# Patient Record
Sex: FEMALE | Race: ASIAN | NOT HISPANIC OR LATINO | URBAN - METROPOLITAN AREA
[De-identification: names, ages, dates, MRNs, and addresses within clinical notes are randomized per-mention and may not be internally consistent; named-entity substitution may affect disease eponyms.]

---

## 2024-06-02 ENCOUNTER — HOSPITAL ENCOUNTER (INPATIENT)
Facility: MEDICAL CENTER | Age: 29
LOS: 5 days | DRG: 460 | End: 2024-06-07
Attending: EMERGENCY MEDICINE | Admitting: SURGERY
Payer: COMMERCIAL

## 2024-06-02 ENCOUNTER — APPOINTMENT (OUTPATIENT)
Dept: RADIOLOGY | Facility: MEDICAL CENTER | Age: 29
DRG: 460 | End: 2024-06-02
Attending: SURGERY
Payer: COMMERCIAL

## 2024-06-02 ENCOUNTER — APPOINTMENT (OUTPATIENT)
Dept: RADIOLOGY | Facility: MEDICAL CENTER | Age: 29
DRG: 460 | End: 2024-06-02
Attending: EMERGENCY MEDICINE
Payer: COMMERCIAL

## 2024-06-02 DIAGNOSIS — S32.009A CLOSED FRACTURE OF LUMBAR SPINE WITHOUT SPINAL CORD LESION, INITIAL ENCOUNTER (HCC): ICD-10-CM
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
## 2024-06-02 DIAGNOSIS — T14.90XA TRAUMA: ICD-10-CM

## 2024-06-02 DIAGNOSIS — S22.008A CLOSED FRACTURE OF SPINOUS PROCESS OF THORACIC VERTEBRA, INITIAL ENCOUNTER (HCC): ICD-10-CM

## 2024-06-02 DIAGNOSIS — S22.009A CLOSED FRACTURE OF TRANSVERSE PROCESS OF THORACIC VERTEBRA, INITIAL ENCOUNTER (HCC): ICD-10-CM

## 2024-06-02 PROBLEM — Z75.8 DISCHARGE PLANNING ISSUES: Status: ACTIVE | Noted: 2024-06-02

## 2024-06-02 PROBLEM — Z53.09 CONTRAINDICATION TO DEEP VEIN THROMBOSIS (DVT) PROPHYLAXIS: Status: ACTIVE | Noted: 2024-06-02

## 2024-06-02 PROBLEM — S32.000A LUMBAR COMPRESSION FRACTURE, CLOSED, INITIAL ENCOUNTER (HCC): Status: ACTIVE | Noted: 2024-06-02

## 2024-06-02 PROBLEM — S81.801A: Status: ACTIVE | Noted: 2024-06-02

## 2024-06-02 PROBLEM — T14.8XXA ABRASION: Status: ACTIVE | Noted: 2024-06-02

## 2024-06-02 LAB
ABO GROUP BLD: NORMAL
ALBUMIN SERPL BCP-MCNC: 4 G/DL (ref 3.2–4.9)
ALBUMIN/GLOB SERPL: 1.7 G/DL
ALP SERPL-CCNC: 44 U/L (ref 30–99)
ALT SERPL-CCNC: 14 U/L (ref 2–50)
ANION GAP SERPL CALC-SCNC: 18 MMOL/L (ref 7–16)
APTT PPP: 26.9 SEC (ref 24.7–36)
AST SERPL-CCNC: 38 U/L (ref 12–45)
BILIRUB SERPL-MCNC: 0.5 MG/DL (ref 0.1–1.5)
BLD GP AB SCN SERPL QL: NORMAL
BUN SERPL-MCNC: 20 MG/DL (ref 8–22)
CALCIUM ALBUM COR SERPL-MCNC: 8.8 MG/DL (ref 8.5–10.5)
CALCIUM SERPL-MCNC: 8.8 MG/DL (ref 8.5–10.5)
CHLORIDE SERPL-SCNC: 103 MMOL/L (ref 96–112)
CO2 SERPL-SCNC: 16 MMOL/L (ref 20–33)
CREAT SERPL-MCNC: 0.65 MG/DL (ref 0.5–1.4)
ERYTHROCYTE [DISTWIDTH] IN BLOOD BY AUTOMATED COUNT: 42.5 FL (ref 35.9–50)
ETHANOL BLD-MCNC: <10.1 MG/DL
GFR SERPLBLD CREATININE-BSD FMLA CKD-EPI: 123 ML/MIN/1.73 M 2
GLOBULIN SER CALC-MCNC: 2.4 G/DL (ref 1.9–3.5)
GLUCOSE SERPL-MCNC: 151 MG/DL (ref 65–99)
HCG SERPL QL: NEGATIVE
HCT VFR BLD AUTO: 39.3 % (ref 37–47)
HGB BLD-MCNC: 13.4 G/DL (ref 12–16)
INR PPP: 1.14 (ref 0.87–1.13)
MCH RBC QN AUTO: 33 PG (ref 27–33)
MCHC RBC AUTO-ENTMCNC: 34.1 G/DL (ref 32.2–35.5)
MCV RBC AUTO: 96.8 FL (ref 81.4–97.8)
PLATELET # BLD AUTO: 287 K/UL (ref 164–446)
PMV BLD AUTO: 9.2 FL (ref 9–12.9)
POTASSIUM SERPL-SCNC: 3.3 MMOL/L (ref 3.6–5.5)
PROT SERPL-MCNC: 6.4 G/DL (ref 6–8.2)
PROTHROMBIN TIME: 14.7 SEC (ref 12–14.6)
RBC # BLD AUTO: 4.06 M/UL (ref 4.2–5.4)
RH BLD: NORMAL
SODIUM SERPL-SCNC: 137 MMOL/L (ref 135–145)
WBC # BLD AUTO: 19.2 K/UL (ref 4.8–10.8)

## 2024-06-02 PROCEDURE — 700105 HCHG RX REV CODE 258: Performed by: SURGERY

## 2024-06-02 PROCEDURE — 96374 THER/PROPH/DIAG INJ IV PUSH: CPT

## 2024-06-02 PROCEDURE — 90471 IMMUNIZATION ADMIN: CPT

## 2024-06-02 PROCEDURE — 770022 HCHG ROOM/CARE - ICU (200)

## 2024-06-02 PROCEDURE — 72131 CT LUMBAR SPINE W/O DYE: CPT

## 2024-06-02 PROCEDURE — 36415 COLL VENOUS BLD VENIPUNCTURE: CPT

## 2024-06-02 PROCEDURE — 90715 TDAP VACCINE 7 YRS/> IM: CPT | Performed by: EMERGENCY MEDICINE

## 2024-06-02 PROCEDURE — 70450 CT HEAD/BRAIN W/O DYE: CPT

## 2024-06-02 PROCEDURE — 700111 HCHG RX REV CODE 636 W/ 250 OVERRIDE (IP): Performed by: NURSE PRACTITIONER

## 2024-06-02 PROCEDURE — 72148 MRI LUMBAR SPINE W/O DYE: CPT

## 2024-06-02 PROCEDURE — 3E0234Z INTRODUCTION OF SERUM, TOXOID AND VACCINE INTO MUSCLE, PERCUTANEOUS APPROACH: ICD-10-PCS | Performed by: EMERGENCY MEDICINE

## 2024-06-02 PROCEDURE — 80053 COMPREHEN METABOLIC PANEL: CPT

## 2024-06-02 PROCEDURE — 82962 GLUCOSE BLOOD TEST: CPT

## 2024-06-02 PROCEDURE — 99222 1ST HOSP IP/OBS MODERATE 55: CPT | Performed by: SURGERY

## 2024-06-02 PROCEDURE — 86901 BLOOD TYPING SEROLOGIC RH(D): CPT

## 2024-06-02 PROCEDURE — 96375 TX/PRO/DX INJ NEW DRUG ADDON: CPT

## 2024-06-02 PROCEDURE — 72128 CT CHEST SPINE W/O DYE: CPT

## 2024-06-02 PROCEDURE — 700117 HCHG RX CONTRAST REV CODE 255: Performed by: EMERGENCY MEDICINE

## 2024-06-02 PROCEDURE — 71045 X-RAY EXAM CHEST 1 VIEW: CPT

## 2024-06-02 PROCEDURE — 72125 CT NECK SPINE W/O DYE: CPT

## 2024-06-02 PROCEDURE — 700111 HCHG RX REV CODE 636 W/ 250 OVERRIDE (IP): Performed by: SURGERY

## 2024-06-02 PROCEDURE — 72170 X-RAY EXAM OF PELVIS: CPT

## 2024-06-02 PROCEDURE — 86900 BLOOD TYPING SEROLOGIC ABO: CPT

## 2024-06-02 PROCEDURE — 82077 ASSAY SPEC XCP UR&BREATH IA: CPT

## 2024-06-02 PROCEDURE — 85730 THROMBOPLASTIN TIME PARTIAL: CPT

## 2024-06-02 PROCEDURE — A9270 NON-COVERED ITEM OR SERVICE: HCPCS | Performed by: SURGERY

## 2024-06-02 PROCEDURE — 700102 HCHG RX REV CODE 250 W/ 637 OVERRIDE(OP): Performed by: SURGERY

## 2024-06-02 PROCEDURE — 85610 PROTHROMBIN TIME: CPT

## 2024-06-02 PROCEDURE — 84703 CHORIONIC GONADOTROPIN ASSAY: CPT

## 2024-06-02 PROCEDURE — 71260 CT THORAX DX C+: CPT

## 2024-06-02 PROCEDURE — G0390 TRAUMA RESPONS W/HOSP CRITI: HCPCS

## 2024-06-02 PROCEDURE — 700111 HCHG RX REV CODE 636 W/ 250 OVERRIDE (IP): Mod: JZ | Performed by: EMERGENCY MEDICINE

## 2024-06-02 PROCEDURE — 73590 X-RAY EXAM OF LOWER LEG: CPT | Mod: RT

## 2024-06-02 PROCEDURE — 99291 CRITICAL CARE FIRST HOUR: CPT

## 2024-06-02 PROCEDURE — 86850 RBC ANTIBODY SCREEN: CPT

## 2024-06-02 PROCEDURE — 85027 COMPLETE CBC AUTOMATED: CPT

## 2024-06-02 RX ORDER — BISACODYL 10 MG
10 SUPPOSITORY, RECTAL RECTAL
Status: DISCONTINUED | OUTPATIENT
Start: 2024-06-02 | End: 2024-06-07 | Stop reason: HOSPADM

## 2024-06-02 RX ORDER — ONDANSETRON 4 MG/1
4 TABLET, ORALLY DISINTEGRATING ORAL EVERY 4 HOURS PRN
Status: DISCONTINUED | OUTPATIENT
Start: 2024-06-02 | End: 2024-06-07 | Stop reason: HOSPADM

## 2024-06-02 RX ORDER — ONDANSETRON 2 MG/ML
4 INJECTION INTRAMUSCULAR; INTRAVENOUS EVERY 4 HOURS PRN
Status: DISCONTINUED | OUTPATIENT
Start: 2024-06-02 | End: 2024-06-07 | Stop reason: HOSPADM

## 2024-06-02 RX ORDER — OXYCODONE HYDROCHLORIDE 5 MG/1
5 TABLET ORAL
Status: DISCONTINUED | OUTPATIENT
Start: 2024-06-02 | End: 2024-06-07 | Stop reason: HOSPADM

## 2024-06-02 RX ORDER — OXYCODONE HYDROCHLORIDE 10 MG/1
10 TABLET ORAL
Status: DISCONTINUED | OUTPATIENT
Start: 2024-06-02 | End: 2024-06-07 | Stop reason: HOSPADM

## 2024-06-02 RX ORDER — ACETAMINOPHEN 500 MG
1000 TABLET ORAL EVERY 6 HOURS
Status: DISCONTINUED | OUTPATIENT
Start: 2024-06-02 | End: 2024-06-07 | Stop reason: HOSPADM

## 2024-06-02 RX ORDER — FAMOTIDINE 20 MG/1
20 TABLET, FILM COATED ORAL 2 TIMES DAILY
Status: DISCONTINUED | OUTPATIENT
Start: 2024-06-02 | End: 2024-06-04

## 2024-06-02 RX ORDER — HYDROMORPHONE HYDROCHLORIDE 1 MG/ML
0.5 INJECTION, SOLUTION INTRAMUSCULAR; INTRAVENOUS; SUBCUTANEOUS
Status: DISCONTINUED | OUTPATIENT
Start: 2024-06-02 | End: 2024-06-03

## 2024-06-02 RX ORDER — ESCITALOPRAM OXALATE 10 MG/1
10 TABLET ORAL DAILY
COMMUNITY

## 2024-06-02 RX ORDER — GABAPENTIN 100 MG/1
100 CAPSULE ORAL EVERY 8 HOURS
Status: DISCONTINUED | OUTPATIENT
Start: 2024-06-02 | End: 2024-06-07 | Stop reason: HOSPADM

## 2024-06-02 RX ORDER — IBUPROFEN 200 MG
600 TABLET ORAL EVERY 8 HOURS PRN
Status: SHIPPED | COMMUNITY
End: 2024-06-02

## 2024-06-02 RX ORDER — CEFAZOLIN 2 G/1
INJECTION, POWDER, FOR SOLUTION INTRAMUSCULAR; INTRAVENOUS
Status: COMPLETED | OUTPATIENT
Start: 2024-06-02 | End: 2024-06-02

## 2024-06-02 RX ORDER — AMOXICILLIN 250 MG
1 CAPSULE ORAL
Status: DISCONTINUED | OUTPATIENT
Start: 2024-06-02 | End: 2024-06-07 | Stop reason: HOSPADM

## 2024-06-02 RX ORDER — DOCUSATE SODIUM 100 MG/1
100 CAPSULE, LIQUID FILLED ORAL 2 TIMES DAILY
Status: DISCONTINUED | OUTPATIENT
Start: 2024-06-02 | End: 2024-06-07 | Stop reason: HOSPADM

## 2024-06-02 RX ORDER — SILICONES/ADHESIVE TAPE
COMBINATION PACKAGE (EA) TOPICAL 3 TIMES DAILY
Status: DISCONTINUED | OUTPATIENT
Start: 2024-06-02 | End: 2024-06-07 | Stop reason: HOSPADM

## 2024-06-02 RX ORDER — METAXALONE 800 MG/1
800 TABLET ORAL 3 TIMES DAILY
Status: DISCONTINUED | OUTPATIENT
Start: 2024-06-02 | End: 2024-06-04

## 2024-06-02 RX ORDER — AMOXICILLIN 250 MG
1 CAPSULE ORAL NIGHTLY
Status: DISCONTINUED | OUTPATIENT
Start: 2024-06-02 | End: 2024-06-07 | Stop reason: HOSPADM

## 2024-06-02 RX ORDER — ONDANSETRON 2 MG/ML
INJECTION INTRAMUSCULAR; INTRAVENOUS
Status: COMPLETED | OUTPATIENT
Start: 2024-06-02 | End: 2024-06-02

## 2024-06-02 RX ORDER — SODIUM CHLORIDE, SODIUM LACTATE, POTASSIUM CHLORIDE, CALCIUM CHLORIDE 600; 310; 30; 20 MG/100ML; MG/100ML; MG/100ML; MG/100ML
INJECTION, SOLUTION INTRAVENOUS CONTINUOUS
Status: DISCONTINUED | OUTPATIENT
Start: 2024-06-02 | End: 2024-06-03

## 2024-06-02 RX ORDER — ACETAMINOPHEN 500 MG
1000 TABLET ORAL EVERY 6 HOURS PRN
Status: DISCONTINUED | OUTPATIENT
Start: 2024-06-07 | End: 2024-06-07 | Stop reason: HOSPADM

## 2024-06-02 RX ORDER — POLYETHYLENE GLYCOL 3350 17 G/17G
1 POWDER, FOR SOLUTION ORAL 2 TIMES DAILY
Status: DISCONTINUED | OUTPATIENT
Start: 2024-06-02 | End: 2024-06-07 | Stop reason: HOSPADM

## 2024-06-02 RX ADMIN — DOCUSATE SODIUM 100 MG: 100 CAPSULE, LIQUID FILLED ORAL at 19:45

## 2024-06-02 RX ADMIN — ONDANSETRON 4 MG: 2 INJECTION INTRAMUSCULAR; INTRAVENOUS at 16:25

## 2024-06-02 RX ADMIN — CLOSTRIDIUM TETANI TOXOID ANTIGEN (FORMALDEHYDE INACTIVATED), CORYNEBACTERIUM DIPHTHERIAE TOXOID ANTIGEN (FORMALDEHYDE INACTIVATED), BORDETELLA PERTUSSIS TOXOID ANTIGEN (GLUTARALDEHYDE INACTIVATED), BORDETELLA PERTUSSIS FILAMENTOUS HEMAGGLUTININ ANTIGEN (FORMALDEHYDE INACTIVATED), BORDETELLA PERTUSSIS PERTACTIN ANTIGEN, AND BORDETELLA PERTUSSIS FIMBRIAE 2/3 ANTIGEN 0.5 ML: 5; 2; 2.5; 5; 3; 5 INJECTION, SUSPENSION INTRAMUSCULAR at 16:30

## 2024-06-02 RX ADMIN — CEFAZOLIN 2 G: 2 INJECTION, POWDER, FOR SOLUTION INTRAMUSCULAR; INTRAVENOUS at 16:22

## 2024-06-02 RX ADMIN — ACETAMINOPHEN 1000 MG: 500 TABLET, FILM COATED ORAL at 19:46

## 2024-06-02 RX ADMIN — METAXALONE 800 MG: 800 TABLET ORAL at 19:45

## 2024-06-02 RX ADMIN — FAMOTIDINE 20 MG: 20 TABLET, FILM COATED ORAL at 19:47

## 2024-06-02 RX ADMIN — IOHEXOL 100 ML: 350 INJECTION, SOLUTION INTRAVENOUS at 17:00

## 2024-06-02 RX ADMIN — FENTANYL CITRATE 100 MCG: 50 INJECTION, SOLUTION INTRAMUSCULAR; INTRAVENOUS at 16:33

## 2024-06-02 RX ADMIN — GABAPENTIN 100 MG: 100 CAPSULE ORAL at 19:45

## 2024-06-02 RX ADMIN — SODIUM CHLORIDE, POTASSIUM CHLORIDE, SODIUM LACTATE AND CALCIUM CHLORIDE: 600; 310; 30; 20 INJECTION, SOLUTION INTRAVENOUS at 19:43

## 2024-06-02 RX ADMIN — OXYCODONE HYDROCHLORIDE 5 MG: 5 TABLET ORAL at 20:41

## 2024-06-02 RX ADMIN — GABAPENTIN 100 MG: 100 CAPSULE ORAL at 23:40

## 2024-06-02 RX ADMIN — ACETAMINOPHEN 1000 MG: 500 TABLET, FILM COATED ORAL at 23:40

## 2024-06-02 ASSESSMENT — LIFESTYLE VARIABLES
EVER FELT BAD OR GUILTY ABOUT YOUR DRINKING: NO
TOTAL SCORE: 0
EVER HAD A DRINK FIRST THING IN THE MORNING TO STEADY YOUR NERVES TO GET RID OF A HANGOVER: NO
ALCOHOL_USE: YES
TOTAL SCORE: 0
CONSUMPTION TOTAL: NEGATIVE
TOTAL SCORE: 0
DOES PATIENT WANT TO STOP DRINKING: NO
HAVE YOU EVER FELT YOU SHOULD CUT DOWN ON YOUR DRINKING: NO
AVERAGE NUMBER OF DAYS PER WEEK YOU HAVE A DRINK CONTAINING ALCOHOL: 0
HAVE PEOPLE ANNOYED YOU BY CRITICIZING YOUR DRINKING: NO
ON A TYPICAL DAY WHEN YOU DRINK ALCOHOL HOW MANY DRINKS DO YOU HAVE: 1
HOW MANY TIMES IN THE PAST YEAR HAVE YOU HAD 5 OR MORE DRINKS IN A DAY: 0

## 2024-06-02 ASSESSMENT — COPD QUESTIONNAIRES
COPD SCREENING SCORE: 0
DURING THE PAST 4 WEEKS HOW MUCH DID YOU FEEL SHORT OF BREATH: NONE/LITTLE OF THE TIME
DO YOU EVER COUGH UP ANY MUCUS OR PHLEGM?: NO/ONLY WITH OCCASIONAL COLDS OR INFECTIONS
HAVE YOU SMOKED AT LEAST 100 CIGARETTES IN YOUR ENTIRE LIFE: NO/DON'T KNOW

## 2024-06-02 ASSESSMENT — PAIN DESCRIPTION - PAIN TYPE
TYPE: ACUTE PAIN

## 2024-06-02 NOTE — H&P
TRAUMA HISTORY AND PHYSICAL    DATE OF SERVICE: 6/2/2024    ACTIVATION LEVEL: Yellow.     HISTORY OF PRESENT ILLNESS: The patient is a 28 year-old woman who was injured when she fell approximately 20 feet rockclimbing.  No LOC.  She is very anxious and repetitive in the trauma bay.  She can answer orientation questions correctly.  Complains of pain in her back.  She has a small laceration over her right tibia.  She had scattered abrasions across her back and right shoulder.    The patient was triaged to [facility] in accordance with established pre hospital protocols. An expeditious primary and secondary survey with required adjuncts was conducted. See Trauma Narrator for full details.    PAST MEDICAL HISTORY: History reviewed. No pertinent past medical history.  No significant past medical history    PAST SURGICAL HISTORY: History reviewed. No pertinent surgical history.   Tonsils     ALLERGIES: Patient has no allergy information on record.       CURRENT MEDICATIONS:   Home Medications       Reviewed by Juana Fernández (Pharmacy Tech) on 06/02/24 at 1657  Med List Status: Complete     Medication Last Dose Status   escitalopram (LEXAPRO) 10 MG Tab 6/1/2024 Active                  Audit from Redirected Encounters    **Home medications have not yet been reviewed for this encounter**        Unable to obtain due to patient condition  Please refer to the medication reconciliation in EPIC    FAMILY HISTORY: family history is not on file.  Unable to obtain due to patient condition    SOCIAL HISTORY:  reports that she has never smoked. She has never used smokeless tobacco. She reports that she does not currently use alcohol. She reports that she does not use drugs.  Patient denies habitual use of alcohol, tobacco, and illicit drugs    REVIEW OF SYSTEMS:   Comprehensive review of systems is negative with the exception of the aforementioned HPI, PMH, and PSH bullets in accordance with CMS  "guidelines.    PHYSICAL EXAMINATION:   Vital Signs: BP 92/58   Pulse 68   Temp 36.6 °C (97.8 °F)   Resp 16   Ht 1.6 m (5' 3\")   Wt 59 kg (130 lb)   SpO2 100%   Physical Exam  Vitals and nursing note reviewed. Exam conducted with a chaperone present.   Constitutional:       Appearance: She is ill-appearing.   HENT:      Head: Normocephalic and atraumatic.      Right Ear: External ear normal.      Left Ear: External ear normal.      Nose: Nose normal.      Mouth/Throat:      Mouth: Mucous membranes are dry.      Pharynx: Oropharynx is clear.   Eyes:      Pupils: Pupils are equal, round, and reactive to light.   Cardiovascular:      Rate and Rhythm: Normal rate and regular rhythm.      Pulses: Normal pulses.   Pulmonary:      Effort: Pulmonary effort is normal.   Abdominal:      Palpations: Abdomen is soft.      Tenderness: There is no abdominal tenderness.   Musculoskeletal:         General: Signs of injury present.      Cervical back: Normal range of motion. No tenderness.      Comments: Laceration over right tibia. Severe pain in spine in thoracolumbar distribution   Skin:     General: Skin is warm.      Capillary Refill: Capillary refill takes less than 2 seconds.      Comments: Abrasions over back and right shoulder   Neurological:      General: No focal deficit present.      Mental Status: She is alert and oriented to person, place, and time.   Psychiatric:      Comments: Anxious and repetitive         LABORATORY VALUES:   Recent Labs     06/02/24  1601   WBC 19.2*   RBC 4.06*   HEMOGLOBIN 13.4   HEMATOCRIT 39.3   MCV 96.8   MCH 33.0   MCHC 34.1   RDW 42.5   PLATELETCT 287   MPV 9.2     Recent Labs     06/02/24  1601   SODIUM 137   POTASSIUM 3.3*   CHLORIDE 103   CO2 16*   GLUCOSE 151*   BUN 20   CREATININE 0.65   CALCIUM 8.8     Recent Labs     06/02/24  1601   ASTSGOT 38   ALTSGPT 14   TBILIRUBIN 0.5   ALKPHOSPHAT 44   GLOBULIN 2.4   INR 1.14*     Recent Labs     06/02/24  1601   APTT 26.9   INR 1.14* "        IMAGING:   CT-LSPINE W/O PLUS RECONS   Final Result      1.  Comminuted 3 column L1 fracture with burst type fracture of the vertebral body with approximately 25% loss of height, mild bony retropulsion. Bilateral facet and left lamina fracture and mild subluxation of the right T12-L1 facet joint.      CT-TSPINE W/O PLUS RECONS   Final Result      1.  Acute 3 column L1 fracture with burst type fracture of the vertebral body and mild bony retropulsion. Bilateral facet and left lamina fracture.   2.  Acute T2-T8 spinous process fractures, several of which are displaced.   3.  Acute right T8 transverse process fracture.      CT-CHEST,ABDOMEN,PELVIS WITH   Final Result      1.  L1 vertebral body and posterior element comminuted fractures.   2.  Fractures of T2-T8 spinous processes.   3.  Trace pleural effusions.      CT-CSPINE WITHOUT PLUS RECONS   Final Result      Acute T1-T3 spinous process fractures.      No acute fracture or dislocation of the cervical spine.      CT-HEAD W/O   Final Result      No acute intracranial abnormality.                  DX-CHEST-LIMITED (1 VIEW)   Final Result      No acute cardiopulmonary abnormality.      DX-PELVIS-1 OR 2 VIEWS   Final Result      No acute osseous abnormality.      MR-LUMBAR SPINE-W/O    (Results Pending)   DX-TIBIA AND FIBULA RIGHT    (Results Pending)       IMPRESSION AND PLAN:  Patient is a 28-year-old female with severe spinal fractures after falling approximately 20 feet.  She also has a laceration over her right tibia with investigation ongoing.  She will need a stat MRI of her lumbar spine.  Her neurologic exam at this point is intact.  She will be admitted to the intensive care unit for further monitoring.  Neurosurgery is consulting.  The below plan was organized by myself.    * Trauma- (present on admission)  Assessment & Plan  Rock climbing, 20 ft fall.  Trauma Yellow Activation.  Carito Colorado MD. Trauma Surgery.    Lumbar compression fracture,  closed, initial encounter (HCC)- (present on admission)  Assessment & Plan  Comminuted 3 column L1 fracture with burst type fracture of the vertebral body with approximately 25% loss of height, mild bony retropulsion. Bilateral facet and left lamina fracture and mild subluxation of the right T12-L1 facet joint.   MRI pending..  Definitive operative reduction and stabilization pending.   Logroll precautions.  Angely Solomon MD. Neurosurgeon. [facility].    Discharge planning issues- (present on admission)  Assessment & Plan  Lives in [place].    Fracture of transverse process of thoracic vertebra, closed, initial encounter (HCC)- (present on admission)  Assessment & Plan  Transverse process fractures T1-T8.    Contraindication to deep vein thrombosis (DVT) prophylaxis- (present on admission)  Assessment & Plan  VTE prophylaxis initially contraindicated secondary to elevated bleeding risk.  6/4 Trauma surveillance venous duplex ultrasonography ordered.    Abrasion- (present on admission)  Assessment & Plan  Multiple abrasions.  Wound care.    Avulsion of leg, right, initial encounter- (present on admission)  Assessment & Plan  Avulsion.  Wound care.  Imaging pending.        Aggregated critical care time spent evaluating, reviewing documentation, providing care, and managing this patient exclusive of procedures: 55 minutes  ____________________________________   Carito Colorado M.D.     NEETA / ALW     DD: 6/2/2024   DT: 4:12 PM

## 2024-06-02 NOTE — ED NOTES
27 yo female david [place] 25 ft fall while rock climbing. Fell onto heels, positive headstrike, positive helmet, GCS15 originally, 45 minute extrication to ambulance, decreased GCS to 14, A&x1, .  100mg IV fent  18g iv placed  8mg zofran  Midline neck pain and mid and lower back pain, small leg lac    Diverted by [employer] [facility]

## 2024-06-02 NOTE — DISCHARGE PLANNING
Trauma Response    Referral: Trauma Yellow Response    Intervention: SW responded to trauma yellow.  Pt was BIB [facility] after fall 25ft.  Pt was alert upon arrival.  Pts name is Sam Hauser (: 1995).  SW obtained the following pt information: Per EMS Pt fell while rock climbing at [facility].  SW was updated by the Pt that her Friend, Trell should be coming to [facility].     Plan: SW will continue to monitor and assist if needs arise.

## 2024-06-02 NOTE — ED PROVIDER NOTES
"ED Provider Note    CHIEF COMPLAINT  Chief Complaint   Patient presents with    Trauma Yellow       EXTERNAL RECORDS REVIEWED  Other none available    HPI/ROS  LIMITATION TO HISTORY   Select: : None  OUTSIDE HISTORIAN(S):  EMS report no seizure activity  although occasional confusion    Willie Armenta is a 28 y.o. female who presents after a fall while she was rockclimbing.  Patient was a [place], fell approximately 25 feet landing on her back.  She was wearing a helmet, no report of LOC.  She is complaining of back pain.  She reports no focal weakness numbness or tingling.  She reports no headache.  She reports no chest pain or abdominal pain.  No nausea or vomiting.  She reports no extremity pain.    She does not take any anticoagulant or antiplatelet medications, tetanus is up-to-date    PAST MEDICAL HISTORY       SURGICAL HISTORY  patient denies any surgical history    FAMILY HISTORY  History reviewed. No pertinent family history.    SOCIAL HISTORY  Social History     Tobacco Use    Smoking status: Never    Smokeless tobacco: Never   Vaping Use    Vaping status: Never Used   Substance and Sexual Activity    Alcohol use: Not Currently     Comment: occ    Drug use: Never    Sexual activity: Not on file       CURRENT MEDICATIONS  Home Medications       Reviewed by Juana Fernández (Pharmacy Tech) on 06/02/24 at 1657  Med List Status: Complete     Medication Last Dose Status   escitalopram (LEXAPRO) 10 MG Tab 6/1/2024 Active                  Audit from Redirected Encounters    **Home medications have not yet been reviewed for this encounter**         ALLERGIES  No Known Allergies    PHYSICAL EXAM  VITAL SIGNS: BP 92/58   Pulse 68   Temp 36.6 °C (97.8 °F)   Resp 16   Ht 1.6 m (5' 3\")   Wt 59 kg (130 lb)   LMP  (LMP Unknown)   SpO2 100%   BMI 23.03 kg/m²    ER PROVIDER NOTE      PRIMARY SURVEY:    Airway: Phonating well,clear  Breathing: Equal breath sounds bilaterally  Circulation: Normal heart " "sounds 2+ pulses at bilateral radial and femoral arteries  Disability:  GCS 15    BP 92/58   Pulse 68   Temp 36.6 °C (97.8 °F)   Resp 16   Ht 1.6 m (5' 3\")   Wt 59 kg (130 lb)   SpO2 100%     Secondary Survey:      Constitutional: Awake, alert, oriented x3.    Heent: Head is normocephalic, atraumatic  Pupils 3mm reactive bilaterally. Midface stable. No malocclusion.    Neck: No tracheal deviation. No midline cervical spine tenderness. C-collar in place.  Cardiovascular: Regular rate and rhythm no murmur rub or gallop intact distal pulses peripherally x4  Pulmonary/Chest: Clavicles nontender to palpation. There is not any chest wall tenderness bilaterally.  No crepitus. Positive breath sounds bilaterally.   Abdominal: Soft, nondistended.  Mild left upper abdominal tenderness. Pelvis is stable to AP and lateral compression.   Musculoskeletal: Right upper extremity atraumatic, palpable radial pulse. 5/5  strength. Full ROM and strength at elbow.  Left upper extremity atraumatic, palpable radial pulse. 5/5  strength. Full ROM and strength at elbow.  Right lower extremity with approximate 2 cm laceration over the anterior shin, atraumatic. 5/5 strength in ankle plantar flexion and dorsiflexion. No pain and full ROM at right knee and hip.   Left  lower extremity atraumatic. 5/5 strength in ankle plantar flexion and dorsiflexion. No pain and full ROM at left knee and hip.   Back: There is extensive bruising and abrasion across the upper and mid back, there is tenderness to the upper and mid T-spine, crepitus is felt, no tenderness or obvious step-off to the lower spine and L-spine     Neurological: Sensation intact to light touch dorsum and plantar surfaces of both feet and the medial and lateral aspects of both lower legs.  Sensation intact to light touch dorsum and plantar surfaces of both hands.   Skin: Skin is warm and dry.  No diaphoresis. No erythema. No pallor.       VITAL SIGNS: BP 92/58   Pulse 68  " " Temp 36.6 °C (97.8 °F)   Resp 16   Ht 1.6 m (5' 3\")   Wt 59 kg (130 lb)   LMP  (LMP Unknown)   SpO2 100%   BMI 23.03 kg/m²   Pulse ox interpretation: I interpret this pulse ox as normal.            EKG/LABS  Labs Reviewed   PROTHROMBIN TIME - Abnormal; Notable for the following components:       Result Value    PT 14.7 (*)     INR 1.14 (*)     All other components within normal limits   COMP METABOLIC PANEL - Abnormal; Notable for the following components:    Potassium 3.3 (*)     Co2 16 (*)     Anion Gap 18.0 (*)     Glucose 151 (*)     All other components within normal limits   CBC WITHOUT DIFFERENTIAL - Abnormal; Notable for the following components:    WBC 19.2 (*)     RBC 4.06 (*)     All other components within normal limits   APTT   HCG QUAL SERUM   DIAGNOSTIC ALCOHOL   COD (ADULT)   ESTIMATED GFR   ABO RH CONFIRM   POCT GLUCOSE   POCT GLUCOSE       I have independently interpreted this EKG    RADIOLOGY/PROCEDURES   I have independently interpreted the diagnostic imaging associated with this visit and am waiting the final reading from the radiologist.   My preliminary interpretation is as follows: Lumbar spinal fracture    Radiologist interpretation:  CT-LSPINE W/O PLUS RECONS   Final Result      1.  Comminuted 3 column L1 fracture with burst type fracture of the vertebral body with approximately 25% loss of height, mild bony retropulsion. Bilateral facet and left lamina fracture and mild subluxation of the right T12-L1 facet joint.      CT-TSPINE W/O PLUS RECONS   Final Result      1.  Acute 3 column L1 fracture with burst type fracture of the vertebral body and mild bony retropulsion. Bilateral facet and left lamina fracture.   2.  Acute T2-T8 spinous process fractures, several of which are displaced.   3.  Acute right T8 transverse process fracture.      CT-CHEST,ABDOMEN,PELVIS WITH   Final Result      1.  L1 vertebral body and posterior element comminuted fractures.   2.  Fractures of T2-T8 " spinous processes.   3.  Trace pleural effusions.      CT-CSPINE WITHOUT PLUS RECONS   Final Result      Acute T1-T3 spinous process fractures.      No acute fracture or dislocation of the cervical spine.      CT-HEAD W/O   Final Result      No acute intracranial abnormality.                  DX-CHEST-LIMITED (1 VIEW)   Final Result      No acute cardiopulmonary abnormality.      DX-PELVIS-1 OR 2 VIEWS   Final Result      No acute osseous abnormality.      MR-LUMBAR SPINE-W/O    (Results Pending)   DX-TIBIA AND FIBULA RIGHT    (Results Pending)       COURSE & MEDICAL DECISION MAKING    ASSESSMENT, COURSE AND PLAN  Care Narrative: [id number]  Patient is evaluated on arrival and expeditious trauma surveys performed.  Patient declines need for pain medication at this time    She does seem intermittently confused and with significant mechanism and head strike we will plan for CT head to evaluate for potential intracranial bleed or skull fracture    She does have some tenderness throughout the upper spine and external signs of injury to that area will obtain CTs throughout the CT T and L-spine.  C-collar is in place    No chest wall tenderness, initial chest x-rays performed in trauma bay without obvious pneumothorax.  Certainly with a large deceleration mechanism consideration for other thoracic injuries so we will proceed with CT    Patient does have some abdominal tenderness after significant trauma, CT will be obtained to evaluate for blunt intra-abdominal trauma    No noted deformities or other orthopedic tenderness suggestive orthopedic injury at this time    Patient is reevaluated on return from CT.  She is now requesting pain medication and fentanyl as ordered    Case is discussed with trauma surgery will admit the patient.    Discussed with Dr. Solomon from spine surgery will consult on the patient            PROBLEMS MANAGED  # Lumbar spine, burst fracture, L1.  Spine surgery consulted, patient in spinal precautions,  MRI pending.  She appears neurologically intact at this time    # Multiple spinous process fractures.  Spine surgery consult    # Transverse process fracture.  Spine surgery consult    # Fall.  Resulting above injuries    # Leg laceration.  Tetanus up-to-date, repair by trauma team    DISPOSITION AND DISCUSSIONS  I have discussed management of the patient with the following physicians and CHELA's: Dr. Solomon from spine surgery as above    Patient is admitted in critical condition    FINAL DIAGNOSIS  1. Closed fracture of lumbar spine without spinal cord lesion, initial encounter ([facility])    2. Fall, initial encounter    3. Closed fracture of transverse process of thoracic vertebra, initial encounter ([facility])    4. Closed fracture of spinous process of thoracic vertebra, initial encounter ([facility])           Electronically signed by: Og Perez M.D., 6/2/2024 4:19 PM

## 2024-06-02 NOTE — ED NOTES
Medication history reviewed with PT at bedside    [facility] rec is complete per PT reporting    Allergies reviewed.     Patient denies any outpatient antibiotics in the last 30 days.     Patient is not taking anticoagulants.    Preferred pharmacy for this visit - [facility] on [address] ([phone])

## 2024-06-02 NOTE — ED TRIAGE NOTES
"Chief Complaint   Patient presents with    Trauma Yellow     /45   Pulse 70   Temp 36.6 °C (97.8 °F)   Resp 20   Ht 1.6 m (5' 3\")   Wt 59 kg (130 lb)   LMP  (LMP Unknown)   SpO2 98%   BMI 23.03 kg/m²     BIBA after fall from approximately 20 feet while climbing.  +helmet, + head strike.  Pt GCS 14.  Pt repetitive in trauma bay but able to follow commands.  1 inch laceration noted to right shin.  Pt evaluated by ERP and trauma surgeon in trauma bay then transported to CT.  Report given to KIRILL Quinonez.    "

## 2024-06-02 NOTE — ED NOTES
Patient to [facility] [address] [facility], patient complaining of severe low back pain, erp ordered another dose of fentanyl. Placed on zoll, NSR 77

## 2024-06-03 ENCOUNTER — APPOINTMENT (OUTPATIENT)
Dept: RADIOLOGY | Facility: MEDICAL CENTER | Age: 29
DRG: 460 | End: 2024-06-03
Attending: NEUROLOGICAL SURGERY
Payer: COMMERCIAL

## 2024-06-03 LAB
ABO + RH BLD: NORMAL
ALBUMIN SERPL BCP-MCNC: 3.9 G/DL (ref 3.2–4.9)
ALBUMIN/GLOB SERPL: 1.8 G/DL
ALP SERPL-CCNC: 37 U/L (ref 30–99)
ALT SERPL-CCNC: 16 U/L (ref 2–50)
ANION GAP SERPL CALC-SCNC: 12 MMOL/L (ref 7–16)
AST SERPL-CCNC: 34 U/L (ref 12–45)
BASOPHILS # BLD AUTO: 0.3 % (ref 0–1.8)
BASOPHILS # BLD: 0.03 K/UL (ref 0–0.12)
BILIRUB SERPL-MCNC: 0.9 MG/DL (ref 0.1–1.5)
BUN SERPL-MCNC: 15 MG/DL (ref 8–22)
CALCIUM ALBUM COR SERPL-MCNC: 8.7 MG/DL (ref 8.5–10.5)
CALCIUM SERPL-MCNC: 8.6 MG/DL (ref 8.5–10.5)
CHLORIDE SERPL-SCNC: 107 MMOL/L (ref 96–112)
CO2 SERPL-SCNC: 20 MMOL/L (ref 20–33)
CREAT SERPL-MCNC: 0.5 MG/DL (ref 0.5–1.4)
EOSINOPHIL # BLD AUTO: 0 K/UL (ref 0–0.51)
EOSINOPHIL NFR BLD: 0 % (ref 0–6.9)
ERYTHROCYTE [DISTWIDTH] IN BLOOD BY AUTOMATED COUNT: 43.1 FL (ref 35.9–50)
GFR SERPLBLD CREATININE-BSD FMLA CKD-EPI: 131 ML/MIN/1.73 M 2
GLOBULIN SER CALC-MCNC: 2.2 G/DL (ref 1.9–3.5)
GLUCOSE BLD STRIP.AUTO-MCNC: 102 MG/DL (ref 65–99)
GLUCOSE BLD STRIP.AUTO-MCNC: 113 MG/DL (ref 65–99)
GLUCOSE SERPL-MCNC: 104 MG/DL (ref 65–99)
HCT VFR BLD AUTO: 33.9 % (ref 37–47)
HGB BLD-MCNC: 12.2 G/DL (ref 12–16)
IMM GRANULOCYTES # BLD AUTO: 0.05 K/UL (ref 0–0.11)
IMM GRANULOCYTES NFR BLD AUTO: 0.5 % (ref 0–0.9)
LYMPHOCYTES # BLD AUTO: 1.33 K/UL (ref 1–4.8)
LYMPHOCYTES NFR BLD: 12.5 % (ref 22–41)
MAGNESIUM SERPL-MCNC: 1.9 MG/DL (ref 1.5–2.5)
MCH RBC QN AUTO: 33.6 PG (ref 27–33)
MCHC RBC AUTO-ENTMCNC: 36 G/DL (ref 32.2–35.5)
MCV RBC AUTO: 93.4 FL (ref 81.4–97.8)
MONOCYTES # BLD AUTO: 0.69 K/UL (ref 0–0.85)
MONOCYTES NFR BLD AUTO: 6.5 % (ref 0–13.4)
NEUTROPHILS # BLD AUTO: 8.57 K/UL (ref 1.82–7.42)
NEUTROPHILS NFR BLD: 80.2 % (ref 44–72)
NRBC # BLD AUTO: 0 K/UL
NRBC BLD-RTO: 0 /100 WBC (ref 0–0.2)
PHOSPHATE SERPL-MCNC: 5 MG/DL (ref 2.5–4.5)
PLATELET # BLD AUTO: 233 K/UL (ref 164–446)
PMV BLD AUTO: 8.9 FL (ref 9–12.9)
POTASSIUM SERPL-SCNC: 4 MMOL/L (ref 3.6–5.5)
PROT SERPL-MCNC: 6.1 G/DL (ref 6–8.2)
RBC # BLD AUTO: 3.63 M/UL (ref 4.2–5.4)
SODIUM SERPL-SCNC: 139 MMOL/L (ref 135–145)
WBC # BLD AUTO: 10.7 K/UL (ref 4.8–10.8)

## 2024-06-03 PROCEDURE — A9270 NON-COVERED ITEM OR SERVICE: HCPCS | Performed by: SURGERY

## 2024-06-03 PROCEDURE — 83735 ASSAY OF MAGNESIUM: CPT

## 2024-06-03 PROCEDURE — 82962 GLUCOSE BLOOD TEST: CPT

## 2024-06-03 PROCEDURE — 97162 PT EVAL MOD COMPLEX 30 MIN: CPT

## 2024-06-03 PROCEDURE — 84100 ASSAY OF PHOSPHORUS: CPT

## 2024-06-03 PROCEDURE — 85025 COMPLETE CBC W/AUTO DIFF WBC: CPT

## 2024-06-03 PROCEDURE — 700102 HCHG RX REV CODE 250 W/ 637 OVERRIDE(OP): Performed by: SURGERY

## 2024-06-03 PROCEDURE — 770022 HCHG ROOM/CARE - ICU (200)

## 2024-06-03 PROCEDURE — 99232 SBSQ HOSP IP/OBS MODERATE 35: CPT | Mod: 25 | Performed by: NURSE PRACTITIONER

## 2024-06-03 PROCEDURE — 0HQKXZZ REPAIR RIGHT LOWER LEG SKIN, EXTERNAL APPROACH: ICD-10-PCS | Performed by: SURGERY

## 2024-06-03 PROCEDURE — 700111 HCHG RX REV CODE 636 W/ 250 OVERRIDE (IP): Mod: JZ | Performed by: SURGERY

## 2024-06-03 PROCEDURE — 700105 HCHG RX REV CODE 258: Performed by: SURGERY

## 2024-06-03 PROCEDURE — 700101 HCHG RX REV CODE 250: Performed by: NURSE PRACTITIONER

## 2024-06-03 PROCEDURE — 97167 OT EVAL HIGH COMPLEX 60 MIN: CPT

## 2024-06-03 PROCEDURE — 80053 COMPREHEN METABOLIC PANEL: CPT

## 2024-06-03 PROCEDURE — 72100 X-RAY EXAM L-S SPINE 2/3 VWS: CPT

## 2024-06-03 PROCEDURE — 97530 THERAPEUTIC ACTIVITIES: CPT

## 2024-06-03 RX ORDER — ENOXAPARIN SODIUM 100 MG/ML
30 INJECTION SUBCUTANEOUS EVERY 12 HOURS
Status: DISCONTINUED | OUTPATIENT
Start: 2024-06-03 | End: 2024-06-03

## 2024-06-03 RX ORDER — ESCITALOPRAM OXALATE 10 MG/1
10 TABLET ORAL DAILY
Status: DISCONTINUED | OUTPATIENT
Start: 2024-06-03 | End: 2024-06-07 | Stop reason: HOSPADM

## 2024-06-03 RX ADMIN — ACETAMINOPHEN 1000 MG: 500 TABLET, FILM COATED ORAL at 05:10

## 2024-06-03 RX ADMIN — ESCITALOPRAM OXALATE 10 MG: 10 TABLET ORAL at 11:39

## 2024-06-03 RX ADMIN — DOCUSATE SODIUM 100 MG: 100 CAPSULE, LIQUID FILLED ORAL at 05:10

## 2024-06-03 RX ADMIN — FAMOTIDINE 20 MG: 20 TABLET, FILM COATED ORAL at 17:24

## 2024-06-03 RX ADMIN — GABAPENTIN 100 MG: 100 CAPSULE ORAL at 21:46

## 2024-06-03 RX ADMIN — LIDOCAINE HYDROCHLORIDE 2 ML: 10 INJECTION, SOLUTION INFILTRATION; PERINEURAL at 11:30

## 2024-06-03 RX ADMIN — ONDANSETRON 4 MG: 4 TABLET, ORALLY DISINTEGRATING ORAL at 21:46

## 2024-06-03 RX ADMIN — ACETAMINOPHEN 1000 MG: 500 TABLET, FILM COATED ORAL at 17:24

## 2024-06-03 RX ADMIN — DEXTROMETHORPHAN HYDROBROMIDE, GUAIFENESIN, PHENYLEPHRINE HYDROCHLORIDE: 20; 200; 10 SOLUTION ORAL at 13:00

## 2024-06-03 RX ADMIN — ONDANSETRON 4 MG: 2 INJECTION INTRAMUSCULAR; INTRAVENOUS at 14:32

## 2024-06-03 RX ADMIN — METAXALONE 800 MG: 800 TABLET ORAL at 17:24

## 2024-06-03 RX ADMIN — SODIUM CHLORIDE, POTASSIUM CHLORIDE, SODIUM LACTATE AND CALCIUM CHLORIDE: 600; 310; 30; 20 INJECTION, SOLUTION INTRAVENOUS at 07:40

## 2024-06-03 RX ADMIN — DEXTROMETHORPHAN HYDROBROMIDE, GUAIFENESIN, PHENYLEPHRINE HYDROCHLORIDE: 20; 200; 10 SOLUTION ORAL at 17:27

## 2024-06-03 RX ADMIN — GABAPENTIN 100 MG: 100 CAPSULE ORAL at 05:10

## 2024-06-03 RX ADMIN — FAMOTIDINE 20 MG: 20 TABLET, FILM COATED ORAL at 05:10

## 2024-06-03 RX ADMIN — METAXALONE 800 MG: 800 TABLET ORAL at 05:10

## 2024-06-03 RX ADMIN — SENNOSIDES AND DOCUSATE SODIUM 1 TABLET: 50; 8.6 TABLET ORAL at 21:46

## 2024-06-03 RX ADMIN — METAXALONE 800 MG: 800 TABLET ORAL at 11:39

## 2024-06-03 RX ADMIN — DOCUSATE SODIUM 100 MG: 100 CAPSULE, LIQUID FILLED ORAL at 17:24

## 2024-06-03 RX ADMIN — ACETAMINOPHEN 1000 MG: 500 TABLET, FILM COATED ORAL at 11:39

## 2024-06-03 RX ADMIN — GABAPENTIN 100 MG: 100 CAPSULE ORAL at 14:32

## 2024-06-03 SDOH — ECONOMIC STABILITY: TRANSPORTATION INSECURITY
IN THE PAST 12 MONTHS, HAS LACK OF RELIABLE TRANSPORTATION KEPT YOU FROM MEDICAL APPOINTMENTS, MEETINGS, WORK OR FROM GETTING THINGS NEEDED FOR DAILY LIVING?: NO

## 2024-06-03 SDOH — ECONOMIC STABILITY: TRANSPORTATION INSECURITY
IN THE PAST 12 MONTHS, HAS THE LACK OF TRANSPORTATION KEPT YOU FROM MEDICAL APPOINTMENTS OR FROM GETTING MEDICATIONS?: NO

## 2024-06-03 ASSESSMENT — COGNITIVE AND FUNCTIONAL STATUS - GENERAL
TOILETING: A LOT
DAILY ACTIVITIY SCORE: 19
MOVING FROM LYING ON BACK TO SITTING ON SIDE OF FLAT BED: A LITTLE
SUGGESTED CMS G CODE MODIFIER MOBILITY: CK
MOVING TO AND FROM BED TO CHAIR: A LITTLE
DRESSING REGULAR UPPER BODY CLOTHING: A LITTLE
STANDING UP FROM CHAIR USING ARMS: A LITTLE
WALKING IN HOSPITAL ROOM: A LITTLE
SUGGESTED CMS G CODE MODIFIER DAILY ACTIVITY: CK
HELP NEEDED FOR BATHING: A LOT
MOBILITY SCORE: 16
DRESSING REGULAR LOWER BODY CLOTHING: A LOT
SUGGESTED CMS G CODE MODIFIER DAILY ACTIVITY: CK
CLIMB 3 TO 5 STEPS WITH RAILING: A LITTLE
CLIMB 3 TO 5 STEPS WITH RAILING: A LOT
TURNING FROM BACK TO SIDE WHILE IN FLAT BAD: A LITTLE
DAILY ACTIVITIY SCORE: 14
SUGGESTED CMS G CODE MODIFIER MOBILITY: CK
MOVING TO AND FROM BED TO CHAIR: A LITTLE
PERSONAL GROOMING: A LITTLE
TURNING FROM BACK TO SIDE WHILE IN FLAT BAD: A LOT
TOILETING: A LITTLE
STANDING UP FROM CHAIR USING ARMS: A LITTLE
DRESSING REGULAR LOWER BODY CLOTHING: A LOT
MOVING FROM LYING ON BACK TO SITTING ON SIDE OF FLAT BED: A LOT
MOBILITY SCORE: 17
EATING MEALS: A LITTLE
DRESSING REGULAR UPPER BODY CLOTHING: A LOT
HELP NEEDED FOR BATHING: A LITTLE
WALKING IN HOSPITAL ROOM: A LITTLE

## 2024-06-03 ASSESSMENT — PAIN DESCRIPTION - PAIN TYPE
TYPE: ACUTE PAIN

## 2024-06-03 ASSESSMENT — GAIT ASSESSMENTS
ASSISTIVE DEVICE: HAND HELD ASSIST
DISTANCE (FEET): 25
GAIT LEVEL OF ASSIST: MINIMAL ASSIST
DEVIATION: SHUFFLED GAIT;BRADYKINETIC;ANTALGIC

## 2024-06-03 ASSESSMENT — SOCIAL DETERMINANTS OF HEALTH (SDOH)
WITHIN THE PAST 12 MONTHS, YOU WORRIED THAT YOUR FOOD WOULD RUN OUT BEFORE YOU GOT THE MONEY TO BUY MORE: NEVER TRUE
WITHIN THE PAST 12 MONTHS, THE FOOD YOU BOUGHT JUST DIDN'T LAST AND YOU DIDN'T HAVE MONEY TO GET MORE: NEVER TRUE
WITHIN THE LAST YEAR, HAVE YOU BEEN AFRAID OF YOUR PARTNER OR EX-PARTNER?: NO
IN THE PAST 12 MONTHS, HAS THE ELECTRIC, GAS, OIL, OR WATER COMPANY THREATENED TO SHUT OFF SERVICE IN YOUR HOME?: NO
WITHIN THE LAST YEAR, HAVE TO BEEN RAPED OR FORCED TO HAVE ANY KIND OF SEXUAL ACTIVITY BY YOUR PARTNER OR EX-PARTNER?: NO
WITHIN THE LAST YEAR, HAVE YOU BEEN KICKED, HIT, SLAPPED, OR OTHERWISE PHYSICALLY HURT BY YOUR PARTNER OR EX-PARTNER?: NO
WITHIN THE LAST YEAR, HAVE YOU BEEN HUMILIATED OR EMOTIONALLY ABUSED IN OTHER WAYS BY YOUR PARTNER OR EX-PARTNER?: NO

## 2024-06-03 ASSESSMENT — ACTIVITIES OF DAILY LIVING (ADL): TOILETING: INDEPENDENT

## 2024-06-03 ASSESSMENT — ENCOUNTER SYMPTOMS
BACK PAIN: 1
EYES NEGATIVE: 1
PSYCHIATRIC NEGATIVE: 1
CARDIOVASCULAR NEGATIVE: 1
MYALGIAS: 1
NECK PAIN: 0
NEUROLOGICAL NEGATIVE: 1
GASTROINTESTINAL NEGATIVE: 1
RESPIRATORY NEGATIVE: 1

## 2024-06-03 ASSESSMENT — FIBROSIS 4 INDEX: FIB4 SCORE: 0.98

## 2024-06-03 ASSESSMENT — PATIENT HEALTH QUESTIONNAIRE - PHQ9
SUM OF ALL RESPONSES TO PHQ9 QUESTIONS 1 AND 2: 0
2. FEELING DOWN, DEPRESSED, IRRITABLE, OR HOPELESS: NOT AT ALL
1. LITTLE INTEREST OR PLEASURE IN DOING THINGS: NOT AT ALL

## 2024-06-03 NOTE — ASSESSMENT & PLAN NOTE
Comminuted 3 column L1 fracture with burst type fracture of the vertebral body with approximately 25% loss of height, mild bony retropulsion. Bilateral facet and left lamina fracture and mild subluxation of the right T12-L1 facet joint.   MRI L1 burst fracture with involvement of posterior elements and extension of the fracture line into the posterior cortex of the vertebral body with mild posterior cortical tilting and slight impingement upon the spinal canal but no cauda equina or conus compression.Thin dorsal epidural hematoma between T10 and L3. No significant mass effect  6/3 Upright films with L1 compression burst fracture with anterior wedging and right lateral wedging resulting in localized curvature convex left.   6/4 ORIF of T12/L1 fractures with posterolateral instrumentation at T12-L1, L1-L2. Posterolateral arthrodesis at T12-L1 and L1-2.   6/6 Hemovac discontinued.  TLSO when out of bed.  No lifting greater than 10 lbs  Angely Solomon MD. Neurosurgeon. [facility].

## 2024-06-03 NOTE — PROGRESS NOTES
Trauma / Surgical Daily Progress Note    Date of Service  6/3/2024    Chief Complaint  28 y.o. female admitted 6/2/2024 with 3 column L1 fracture and multiple thoracic spine fractures.     Interval Events    Tertiary exam completed.  GCS 15 with no focal neurological deficits.   Pharmacological DVT prophylaxis, no.   Antibiotic therapy, no.  Right lower extremity wound.    - Initiate pharmacological DVT prophylaxis when cleared by neurosurgery  - Upright films completed. Neurosurgery recs pending.  - Right lower extremity closure.  - Disposition: Lives in [place].     Review of Systems  Review of Systems   Constitutional:  Positive for malaise/fatigue.   HENT: Negative.     Eyes: Negative.    Respiratory: Negative.     Cardiovascular: Negative.    Gastrointestinal: Negative.    Musculoskeletal:  Positive for back pain and myalgias. Negative for joint pain and neck pain.   Neurological: Negative.    Psychiatric/Behavioral: Negative.          Vital Signs  Temp:  [36.4 °C (97.6 °F)-36.6 °C (97.8 °F)] 36.6 °C (97.8 °F)  Pulse:  [54-78] 57  Resp:  [15-31] 24  BP: ()/(45-72) 103/63  SpO2:  [96 %-100 %] 98 %    Physical Exam  Physical Exam  Vitals and nursing note reviewed.   Constitutional:       General: She is awake. She is not in acute distress.     Appearance: Normal appearance. She is well-developed and normal weight. She is not ill-appearing or toxic-appearing.   HENT:      Head: Normocephalic.      Right Ear: External ear normal.      Left Ear: External ear normal.      Nose: Nose normal. No congestion.   Cardiovascular:      Rate and Rhythm: Normal rate and regular rhythm.      Pulses: Normal pulses.   Pulmonary:      Effort: No respiratory distress.   Chest:      Chest wall: No tenderness.   Abdominal:      General: There is no distension.      Tenderness: There is no abdominal tenderness. There is no guarding or rebound.   Musculoskeletal:      Cervical back: Neck supple.      Comments: Moves all  extremities    Skin:     General: Skin is warm and dry.      Capillary Refill: Capillary refill takes less than 2 seconds.      Comments: Right lower extremity laceration.    Neurological:      General: No focal deficit present.      Mental Status: She is alert.      GCS: GCS eye subscore is 4. GCS verbal subscore is 5. GCS motor subscore is 6.   Psychiatric:         Mood and Affect: Mood normal.         Behavior: Behavior normal.         Thought Content: Thought content normal.         Judgment: Judgment normal.         Laboratory  Recent Results (from the past 24 hour(s))   Prothrombin Time    Collection Time: 06/02/24  4:01 PM   Result Value Ref Range    PT 14.7 (H) 12.0 - 14.6 sec    INR 1.14 (H) 0.87 - 1.13   APTT    Collection Time: 06/02/24  4:01 PM   Result Value Ref Range    APTT 26.9 24.7 - 36.0 sec   HCG QUAL SERUM    Collection Time: 06/02/24  4:01 PM   Result Value Ref Range    Beta-Hcg Qualitative Serum Negative Negative   DIAGNOSTIC ALCOHOL    Collection Time: 06/02/24  4:01 PM   Result Value Ref Range    Diagnostic Alcohol <10.1 <10.1 mg/dL   Comp Metabolic Panel    Collection Time: 06/02/24  4:01 PM   Result Value Ref Range    Sodium 137 135 - 145 mmol/L    Potassium 3.3 (L) 3.6 - 5.5 mmol/L    Chloride 103 96 - 112 mmol/L    Co2 16 (L) 20 - 33 mmol/L    Anion Gap 18.0 (H) 7.0 - 16.0    Glucose 151 (H) 65 - 99 mg/dL    Bun 20 8 - 22 mg/dL    Creatinine 0.65 0.50 - 1.40 mg/dL    Calcium 8.8 8.5 - 10.5 mg/dL    Correct Calcium 8.8 8.5 - 10.5 mg/dL    AST(SGOT) 38 12 - 45 U/L    ALT(SGPT) 14 2 - 50 U/L    Alkaline Phosphatase 44 30 - 99 U/L    Total Bilirubin 0.5 0.1 - 1.5 mg/dL    Albumin 4.0 3.2 - 4.9 g/dL    Total Protein 6.4 6.0 - 8.2 g/dL    Globulin 2.4 1.9 - 3.5 g/dL    A-G Ratio 1.7 g/dL   CBC WITHOUT DIFFERENTIAL    Collection Time: 06/02/24  4:01 PM   Result Value Ref Range    WBC 19.2 (H) 4.8 - 10.8 K/uL    RBC 4.06 (L) 4.20 - 5.40 M/uL    Hemoglobin 13.4 12.0 - 16.0 g/dL    Hematocrit 39.3  37.0 - 47.0 %    MCV 96.8 81.4 - 97.8 fL    MCH 33.0 27.0 - 33.0 pg    MCHC 34.1 32.2 - 35.5 g/dL    RDW 42.5 35.9 - 50.0 fL    Platelet Count 287 164 - 446 K/uL    MPV 9.2 9.0 - 12.9 fL   COD - Adult (Type and Screen)    Collection Time: 06/02/24  4:01 PM   Result Value Ref Range    ABO Grouping Only O     Rh Grouping Only POS     Antibody Screen-Cod NEG    ESTIMATED GFR    Collection Time: 06/02/24  4:01 PM   Result Value Ref Range    GFR (CKD-EPI) 123 >60 mL/min/1.73 m 2   POCT glucose device results    Collection Time: 06/02/24 10:52 PM   Result Value Ref Range    POC Glucose, Blood 113 (H) 65 - 99 mg/dL   ABO Rh Confirm    Collection Time: 06/03/24  4:16 AM   Result Value Ref Range    ABO Rh Confirm O POS    CBC with Differential: Tomorrow AM    Collection Time: 06/03/24  4:16 AM   Result Value Ref Range    WBC 10.7 4.8 - 10.8 K/uL    RBC 3.63 (L) 4.20 - 5.40 M/uL    Hemoglobin 12.2 12.0 - 16.0 g/dL    Hematocrit 33.9 (L) 37.0 - 47.0 %    MCV 93.4 81.4 - 97.8 fL    MCH 33.6 (H) 27.0 - 33.0 pg    MCHC 36.0 (H) 32.2 - 35.5 g/dL    RDW 43.1 35.9 - 50.0 fL    Platelet Count 233 164 - 446 K/uL    MPV 8.9 (L) 9.0 - 12.9 fL    Neutrophils-Polys 80.20 (H) 44.00 - 72.00 %    Lymphocytes 12.50 (L) 22.00 - 41.00 %    Monocytes 6.50 0.00 - 13.40 %    Eosinophils 0.00 0.00 - 6.90 %    Basophils 0.30 0.00 - 1.80 %    Immature Granulocytes 0.50 0.00 - 0.90 %    Nucleated RBC 0.00 0.00 - 0.20 /100 WBC    Neutrophils (Absolute) 8.57 (H) 1.82 - 7.42 K/uL    Lymphs (Absolute) 1.33 1.00 - 4.80 K/uL    Monos (Absolute) 0.69 0.00 - 0.85 K/uL    Eos (Absolute) 0.00 0.00 - 0.51 K/uL    Baso (Absolute) 0.03 0.00 - 0.12 K/uL    Immature Granulocytes (abs) 0.05 0.00 - 0.11 K/uL    NRBC (Absolute) 0.00 K/uL   Comp Metabolic Panel (CMP): Tomorrow AM    Collection Time: 06/03/24  4:16 AM   Result Value Ref Range    Sodium 139 135 - 145 mmol/L    Potassium 4.0 3.6 - 5.5 mmol/L    Chloride 107 96 - 112 mmol/L    Co2 20 20 - 33 mmol/L    Anion  Gap 12.0 7.0 - 16.0    Glucose 104 (H) 65 - 99 mg/dL    Bun 15 8 - 22 mg/dL    Creatinine 0.50 0.50 - 1.40 mg/dL    Calcium 8.6 8.5 - 10.5 mg/dL    Correct Calcium 8.7 8.5 - 10.5 mg/dL    AST(SGOT) 34 12 - 45 U/L    ALT(SGPT) 16 2 - 50 U/L    Alkaline Phosphatase 37 30 - 99 U/L    Total Bilirubin 0.9 0.1 - 1.5 mg/dL    Albumin 3.9 3.2 - 4.9 g/dL    Total Protein 6.1 6.0 - 8.2 g/dL    Globulin 2.2 1.9 - 3.5 g/dL    A-G Ratio 1.8 g/dL   Magnesium: Every Monday and Thursday AM    Collection Time: 06/03/24  4:16 AM   Result Value Ref Range    Magnesium 1.9 1.5 - 2.5 mg/dL   Phosphorus: Every Monday and Thursday AM    Collection Time: 06/03/24  4:16 AM   Result Value Ref Range    Phosphorus 5.0 (H) 2.5 - 4.5 mg/dL   ESTIMATED GFR    Collection Time: 06/03/24  4:16 AM   Result Value Ref Range    GFR (CKD-EPI) 131 >60 mL/min/1.73 m 2   POCT glucose device results    Collection Time: 06/03/24  6:34 AM   Result Value Ref Range    POC Glucose, Blood 102 (H) 65 - 99 mg/dL       Fluids    Intake/Output Summary (Last 24 hours) at 6/3/2024 1121  Last data filed at 6/3/2024 1000  Gross per 24 hour   Intake 1968.3 ml   Output 890 ml   Net 1078.3 ml     Mental status adequate for full examination?: Yes    Spine cleared (radiologically and/or clinically): Known fractures with bracing.     All current laboratory studies/radiology exams reviewed: Yes    Medications reconciliation has been reviewed: Yes    Completed Consultations:  Neurosurgery      Pending Consultations:  None    Newly identified diagnoses, injuries and/or co-morbidities:  None    PDI 5      Core Measures & Quality Metrics  Labs reviewed, Medications reviewed and Radiology images reviewed  Castro catheter: No Castro      DVT Prophylaxis: Contraindicated - Anemia requiring blood transfusion  DVT prophylaxis - mechanical: SCDs  Ulcer prophylaxis: Not indicated    Assessed for rehab: Patient was assess for and/or received rehabilitation services during this  hospitalization    RAP Score Total: 2    CAGE Results: negative Blood Alcohol>0.08: no       Assessment/Plan  * Trauma- (present on admission)  Assessment & Plan  Rock climbing, 20 ft fall.  Trauma Yellow Activation.  Carito Colorado MD. Trauma Surgery.    Lumbar compression fracture, closed, initial encounter ([facility])- (present on admission)  Assessment & Plan  Comminuted 3 column L1 fracture with burst type fracture of the vertebral body with approximately 25% loss of height, mild bony retropulsion. Bilateral facet and left lamina fracture and mild subluxation of the right T12-L1 facet joint.   MRI L1 burst fracture with involvement of posterior elements and extension of the fracture line into the posterior cortex of the vertebral body with mild posterior cortical tilting and slight impingement upon the spinal canal but no cauda equina or conus compression.   6/3 Upright films  Thin dorsal epidural hematoma between T10 and L3. No significant mass effect  Non-operative management.   Off-the-shelf TLSO bracing. Logroll precautions.  Angely Solomon MD. Neurosurgeon. [facility].    Discharge planning issues- (present on admission)  Assessment & Plan  Lives in [place].    Fracture of transverse process of thoracic vertebra, closed, initial encounter ([facility])- (present on admission)  Assessment & Plan  Transverse process fractures T1-T8.    Contraindication to deep vein thrombosis (DVT) prophylaxis- (present on admission)  Assessment & Plan  VTE prophylaxis initially contraindicated secondary to elevated bleeding risk.    Abrasion- (present on admission)  Assessment & Plan  Multiple abrasions.  Wound care.    Avulsion of leg, right, initial encounter- (present on admission)  Assessment & Plan  Avulsion.  Wound care.  Staple closure.       Discussed patient condition with Patient and trauma surgery, Dr. Carito Colorado.

## 2024-06-03 NOTE — THERAPY
Physical Therapy   Initial Evaluation     Patient Name: Tracy Hauser  Age:  28 y.o., Sex:  female  Medical Record #: [id number]  Today's Date: 6/3/2024     Precautions  Precautions: Fall Risk;Spinal / Back Precautions ;TLSO (Thoracolumbosacral orthosis)  Comments: TLSO donned in supine until cleared post upright imaging    Assessment  Patient is 28 y.o. female admitted post fall while rock climbing. Pt sustained a 3 column L1 burst fracture, T12-L1 facet subluxation, transverse process fractures T1-T8, and R LE wound. Her spine fxs are currently being managed non-op in TLSO donned in supine until upright imaging is completed.   Pt received flat in bed, parents at bedside. Education provided on spine precautions and assistance provided for donning TLSO in supine. No strength or sensory deficits noted. Min assist required for bed mobility, STS, and 25ft of gait with HHA. Cues provided throughout for spine precautions and compensatory strategies. Pt most limited by pain and dizziness with mobility. Anticipate she will progress well with mobility while in acute care setting. PT will cont while in house to address deficits.     Plan    Physical Therapy Initial Treatment Plan   Treatment Plan : Bed Mobility, Equipment, Family / Caregiver Training, Gait Training, Neuro Re-Education / Balance, Self Care / Home Evaluation, Stair Training, Therapeutic Activities, Therapeutic Exercise  Treatment Frequency: 5 Times per Week  Duration: Until Therapy Goals Met    DC Equipment Recommendations: Unable to determine at this time  Discharge Recommendations: Other - (anticipate dc home with parents once able to complete transfers and gait without assistance)          06/03/24 0958   Vitals   O2 Delivery Device Room air w/o2 available   Vitals Comments dizziness reported, SBP 90s   Pain 0 - 10 Group   Therapist Pain Assessment During Activity;Nurse Notified  (moderate back pain)   Prior Living Situation   Prior Services Home-Independent    Housing / Facility Motor Home   Steps Into Home 1  (very large step, truck tailgait)   Equipment Owned None   Lives with - Patient's Self Care Capacity Alone and Able to Care For Self   Comments Pt has been traveling and staying in her van for the last couple months. they are from [place], parents are at bedside   Prior Level of Functional Mobility   Bed Mobility Independent   Transfer Status Independent   Ambulation Independent   Ambulation Distance community   Assistive Devices Used None   Stairs Independent   Comments independent and active   Cognition    Level of Consciousness Alert   Comments cooperative and receptive   Active ROM Upper Body   Active ROM Upper Body  X   Comments limited by pain   Active ROM Lower Body    Active ROM Lower Body  WDL   Strength Lower Body   Lower Body Strength  X   Comments limited during assessment by pain   Sensation Lower Body   Lower Extremity Sensation   WDL   Coordination Lower Body    Coordination Lower Body  WDL   Other Treatments   Other Treatments Provided parents at bedside   Balance Assessment   Sitting Balance (Static) Fair -   Sitting Balance (Dynamic) Poor +   Standing Balance (Static) Poor +   Standing Balance (Dynamic) Poor +   Weight Shift Sitting Fair   Weight Shift Standing Fair   Comments HHA   Bed Mobility    Supine to Sit Minimal Assist   Scooting Minimal Assist   Rolling Minimal Assist to Rt.;Minimum Assist to Lt.   Comments log roll, flat bed   Gait Analysis   Gait Level Of Assist Minimal Assist   Assistive Device Hand Held Assist   Distance (Feet) 25   # of Times Distance was Traveled 1   Deviation Shuffled Gait;Bradykinetic;Antalgic   Weight Bearing Status no restrictions   Comments limited by pain and dizziness   Functional Mobility   Sit to Stand Minimal Assist   Bed, Chair, Wheelchair Transfer Minimal Assist   Transfer Method Stand Step   Mobility in room with HHA   Edema / Skin Assessment   Edema / Skin  Not Assessed   Patient / Family Goals     Patient / Family Goal #1 get better   Short Term Goals    Short Term Goal # 1 pt will be able to complete supine<>sitting from flat bed with SPV in 6tx in order to progress to prior level   Short Term Goal # 2 pt will be able to complete STS with no AD and SPV in 6tx in order to progress to prior level   Short Term Goal # 3 pt will be able to ambulate 150ft with no AD and SPV in 6tx in order to improve mobility   Education Group   Education Provided Role of Physical Therapist;Spine Precautions;Brace Wear and Care;Gait Training;Transfer Status   Spine Precautions Patient Response Patient;Family;Acceptance;Explanation;Verbal Demonstration   Role of Physical Therapist Patient Response Patient;Family;Acceptance;Explanation;Demonstration;Verbal Demonstration;Action Demonstration   Gait Training Patient Response Patient;Acceptance;Explanation;Action Demonstration   Transfer Status Patient Response Patient;Acceptance;Demonstration;Action Demonstration   Brace Wear & Care Patient Response Patient;Acceptance;Demonstration;Action Demonstration   Physical Therapy Initial Treatment Plan    Treatment Plan  Bed Mobility;Equipment;Family / Caregiver Training;Gait Training;Neuro Re-Education / Balance;Self Care / Home Evaluation;Stair Training;Therapeutic Activities;Therapeutic Exercise   Treatment Frequency 5 Times per Week   Duration Until Therapy Goals Met   Anticipated Discharge Equipment and Recommendations   DC Equipment Recommendations Unable to determine at this time   Discharge Recommendations Other -  (anticipate dc home with parents once able to complete transfers and gait without assistance)       Patient benefited from team evaluation with Occupational Therapist for the following reason(s):  Due to the medical complexity, the skill of both practitioners is needed to monitor vitals, patient status, and adjust the intervention to fit the patient's needs and goals.

## 2024-06-03 NOTE — PROCEDURES
Laceration Repair Procedure Note     Indication:     Procedure: Risk and benefits of laceration repair discussed with patient, including bleeding, infection, and reaction. Verbal consent was obtained. The patient was placed in the appropriate position and under clean conditions anesthesia around the laceration was obtained. The area was then irrigated with high pressure normal saline and cleaned with betadine. Under sterile conditions the laceration was closed with 3 staples Good wound edge approximation was achieved.     Total repaired wound length: 2 cm.     Other Items: None     The patient tolerated the procedure well.     Complications: None

## 2024-06-03 NOTE — THERAPY
"Occupational Therapy   Initial Evaluation     Patient Name: Tracy Hauser  Age:  28 y.o., Sex:  female  Medical Record #: [id number]  Today's Date: 6/3/2024     Precautions  Precautions: Fall Risk, Spinal / Back Precautions , TLSO (Thoracolumbosacral orthosis)  Comments: TLSO donned supine until upright imaging done    Assessment  Patient is 28 y.o. female admitted after ~25 foot fall while rock climbing with + head strike + helmet. She sustained an L1 burst fx with mild subluxation of T12-L1 facet joint, and T1-T8 TP fx. Neurosx wanted to see how she did with mobilizing with OT and PT and then obtain upright films for further POC.  Additional factors influencing patient status / progress: weakness, fatigue, impaired balance, pain.      Plan    Occupational Therapy Initial Treatment Plan   Treatment Interventions: Self Care / Activities of Daily Living, Adaptive Equipment, Therapeutic Exercises, Neuro Re-Education / Balance, Therapeutic Activity  Treatment Frequency: 5 Times per Week  Duration: Until Therapy Goals Met    DC Equipment Recommendations: Unable to determine at this time  Discharge Recommendations: Recommend home health for continued occupational therapy services (with family support once able to complete ADLs without assist)     Subjective    \"The brace feels nice and supportive.\"     Objective       06/03/24 0956   Prior Living Situation   Prior Services Home-Independent   Lives with - Patient's Self Care Capacity Alone and Able to Care For Self   Comments Pt has been doing riskmethods-life for the past few months. She is from [place]. Her parents have been road tripping with her intermittently. Logistics of how she's going to get back to [place] are unknown at this point. She also has a small wiener dog named Rui   Prior Level of ADL Function   Self Feeding Independent   Grooming / Hygiene Independent   Bathing Independent   Dressing Independent   Toileting Independent   Prior Level of IADL Function "   Medication Management Independent   Laundry Independent   Kitchen Mobility Independent   Finances Independent   Home Management Independent   Shopping Independent   Prior Level Of Mobility Independent Without Device in Community;Independent Without Device in Home   Driving / Transportation Driving Independent   Leisure Interests Pets   Precautions   Precautions Fall Risk;Spinal / Back Precautions ;TLSO (Thoracolumbosacral orthosis)   Comments TLSO donned supine until upright imaging done   Vitals   Vitals Comments pt reported dizziness with positional changes; VSS throughout   Pain 0 - 10 Group   Therapist Pain Assessment Nurse Notified;During Activity  (mod c/o back pain)   Cognition    Cognition / Consciousness WDL   Level of Consciousness Alert   Comments pleasant and cooperative, receptive to edu   Active ROM Upper Body   Active ROM Upper Body  X   Comments BUE grossly limited by referred back pain   Balance Assessment   Sitting Balance (Static) Fair -   Sitting Balance (Dynamic) Poor +   Standing Balance (Static) Poor +   Standing Balance (Dynamic) Poor +   Weight Shift Sitting Fair   Weight Shift Standing Fair   Comments w/ HHA   Bed Mobility    Supine to Sit Minimal Assist   Scooting Minimal Assist   Rolling Minimum Assist to Lt.;Minimal Assist to Rt.   Comments via log roll   ADL Assessment   Eating Supervision  (drink water)   Grooming Maximal Assist;Seated  (put hair up into pony tail, limited by pain w/ overhead reaching)   Upper Body Dressing Total Assist  (don TLSO in supine)   Lower Body Dressing Maximal Assist  (don socks)   Toileting   (catheter)   How much help from another person does the patient currently need...   Putting on and taking off regular lower body clothing? 2   Bathing (including washing, rinsing, and drying)? 2   Toileting, which includes using a toilet, bedpan, or urinal? 2   Putting on and taking off regular upper body clothing? 2   Taking care of personal grooming such as  brushing teeth? 3   Eating meals? 3   6 Clicks Daily Activity Score 14   Functional Mobility   Sit to Stand Minimal Assist   Bed, Chair, Wheelchair Transfer Minimal Assist   Mobility EOB>amb around bed to chair   Comments w/ HHA   Patient / Family Goals   Patient / Family Goal #1 go home   Short Term Goals   Short Term Goal # 1 pt will demo ADL txfs w/ supv   Short Term Goal # 2 pt will dress UB including TLSO w/ supv   Short Term Goal # 3 pt will dress LB w/ supv and AE prn   Short Term Goal # 4 pt will demo toileting w/ supv     Pt seen for OT eval in coordination with PT due to the need for two skilled therapists due to limited mobility and high pain

## 2024-06-03 NOTE — PROGRESS NOTES
1130: Dr Solomon at bedside to discuss POC with patient, all questions answered and removed C-collar. New orders received.

## 2024-06-03 NOTE — ED NOTES
Bedside report to TICU RN. Informed RN that MRI reported they would be ready soon. Patient unable to urinate with purewick. Patient to TICU with belongings.

## 2024-06-03 NOTE — PROGRESS NOTES
Patient arrived to 913 on monitor accompanied by SPENSER RN      Patient's vital signs on admit:     HR: 69  BP: 104/55  RR: 16  SaO2: 100 on 2  Wt:  59.3 kg  Temp: 98.3 F  ___________________________________________________________________________     4 Eyes Skin Assessment Completed by Tate RN and KIRILL Garces.    Head WDL  Ears WDL  Nose WDL  Mouth WDL  Neck WDL  Breast/Chest WDL  Shoulder Blades WDL  Spine abrasion  (R) Arm/Elbow/Hand Bruising and Abrasion upper arm, bloody knuckles  (L) Arm/Elbow/Hand Bruising  Abdomen WDL  Groin WDL  Scrotum/Coccyx/Buttocks WDL  (R) Leg Redness, Bruising, and Abrasion avulsion to shin  (L) Leg Bruising and Abrasion  (R) Heel/Foot/Toe WDL  (L) Heel/Foot/Toe WDL    Devices In Places ECG, Blood Pressure Cuff, Pulse Ox, Castro, SCD's, Nasal Cannula, and Cervical Collar    Interventions In Place Gray Ear Foams, Sacral Mepilex, TAP System, Q2 Turns, Low Air Loss Mattress, and ZFlo Pillow    Possible Skin Injury Yes    Pictures Uploaded Into Epic Yes  Wound Consult Placed Yes  RN Wound Prevention Protocol Ordered Yes  ___________________________________________________________________________     Patient's personal belongings include:     Climbing shoes, cell phone, underwear, shorts, three earrings (in a denture cup)

## 2024-06-03 NOTE — PROGRESS NOTES
Neurosurgery Progress Note    Subjective:  No acute events overnight  Having mid back pain with movement  Denies weakness, n/t    Exam:  A&O  PERRL  URENA with 5/5 strength  SILT      BP  Min: 89/53  Max: 116/56  Pulse  Av.9  Min: 54  Max: 78  Resp  Av.4  Min: 15  Max: 31  Temp  Av.5 °C (97.7 °F)  Min: 36.4 °C (97.6 °F)  Max: 36.6 °C (97.8 °F)  Monitored Temp 2  Av.1 °C (98.7 °F)  Min: 36.7 °C (98.1 °F)  Max: 37.6 °C (99.7 °F)  SpO2  Av.2 %  Min: 96 %  Max: 100 %    No data recorded    Recent Labs     24  1601 24  0416   WBC 19.2* 10.7   RBC 4.06* 3.63*   HEMOGLOBIN 13.4 12.2   HEMATOCRIT 39.3 33.9*   MCV 96.8 93.4   MCH 33.0 33.6*   MCHC 34.1 36.0*   RDW 42.5 43.1   PLATELETCT 287 233   MPV 9.2 8.9*     Recent Labs     24  1601 24  0416   SODIUM 137 139   POTASSIUM 3.3* 4.0   CHLORIDE 103 107   CO2 16* 20   GLUCOSE 151* 104*   BUN 20 15   CREATININE 0.65 0.50   CALCIUM 8.8 8.6     Recent Labs     24  1601   APTT 26.9   INR 1.14*           Intake/Output                         24 - 2459 24 - 24 Total  Total                 Intake    P.O.  --  -- --  250  -- 250    P.O. -- -- -- 250 -- 250    I.V.  600  824 1424  294.3  -- 294.3    Pre-Hospital Volume 600 -- 600 -- -- --    Trauma Resuscitation Volume 0 -- 0 -- -- --    Volume (mL) (LR infusion) -- 824 824 294.3 -- 294.3    Blood  0  -- 0  --  -- --    PRBC Total Volume (Non-Barcoded) 0 -- 0 -- -- --    FFP Total Volume (Non-Barcoded) 0 -- 0 -- -- --    Platelets Total Volume (Non-Barcoded) 0 -- 0 -- -- --    Cryoprecipitate (Pooled) Total Volume (Non-Barcoded) 0 -- 0 -- -- --    Total Intake  544.3 -- 544.3       Output    Urine  --  740 740  150  -- 150    Output (mL) ([REMOVED] Urethral Catheter 16 Fr. 24 3212) -- 740 740 150 -- 150    Other  0  -- 0  --  -- --    Pre-Hospital Output 0 -- 0 -- -- --    Trauma  Resuscitation Output 0 -- 0 -- -- --    Stool  --  -- --  --  -- --    Number of Times Stooled -- 0 x 0 x -- -- --    Blood  0  -- 0  --  -- --    Est. Blood Loss 0 -- 0 -- -- --    Total Output 0 740 740 150 -- 150       Net I/O     600 84 684 394.3 -- 394.3              Intake/Output Summary (Last 24 hours) at 6/3/2024 1107  Last data filed at 6/3/2024 1000  Gross per 24 hour   Intake 1968.3 ml   Output 890 ml   Net 1078.3 ml             escitalopram  10 mg DAILY    Respiratory Therapy Consult   Continuous RT    Pharmacy Consult Request  1 Each PHARMACY TO DOSE    ondansetron  4 mg Q4HRS PRN    ondansetron  4 mg Q4HRS PRN    docusate sodium  100 mg BID    senna-docusate  1 Tablet Nightly    senna-docusate  1 Tablet Q24HRS PRN    polyethylene glycol/lytes  1 Packet BID    magnesium hydroxide  30 mL DAILY AT 1800    bisacodyl  10 mg Q24HRS PRN    sodium phosphate  1 Each Once PRN    acetaminophen  1,000 mg Q6HRS    Followed by    [START ON 6/7/2024] acetaminophen  1,000 mg Q6HRS PRN    oxyCODONE immediate-release  5 mg Q3HRS PRN    Or    oxyCODONE immediate-release  10 mg Q3HRS PRN    gabapentin  100 mg Q8HRS    metaxalone  800 mg TID    famotidine  20 mg BID    Or    famotidine  20 mg BID    bacitracin-polymyxin b   TID     Tracy Hauser is a 28 y.o. female presenting with multiple thoracic spinous process fractures, a 3-column burst fracture at L1, mild loss of height, mild retropulsion, T12-L1 right joint subluxation, KALI E, TLICS 4.     Assessment and Plan:  Hospital day #2  POD #n/a  Prophylactic anticoagulation: yes         Start date/time: 6/2     Brain Compression: No    - Will need standing xrays to determine stability of spine, may need surgery if unstable  - Keep log roll precautions until TLSO brace is available, don in bed until xrays completed and reviewed  - Ok for diet today, no surgical interventions today    Please call with any questions    Liya E Dmitriy    My total time spent caring for the  patient on the day of the encounter was 35 minutes.   This does not include time spent on separately billable procedures/tests.

## 2024-06-03 NOTE — ED NOTES
Patient's belongings found, patient given her cellphone. Parent's updated, patient talked to her family

## 2024-06-03 NOTE — ASSESSMENT & PLAN NOTE
VTE prophylaxis initially contraindicated secondary to elevated bleeding risk.  6/5 Trauma screening bilateral lower extremity venous duplex negative for above knee DVT.  6/7 Prophylactic dose enoxaparin 30 mg BID initiated.   VTE Prophylaxis approved by Neurosurgery.

## 2024-06-03 NOTE — CONSULTS
Neurosurgery Consult Note    Patient: Willie Armenta MRN: [id number]    Date of Consultation: 6/2/2024  Time of Page: 8366  Time of Response: 2806    Reason for Consultation: spine fractures    Referring Physician: Dr. Og Perez MD Emergency Medicine    History of Present Illness:  The patient is a 28 y.o. female presenting after fall from height with about 20 to 25 feet while rockclimbing.  She does not recall how she landed or how she mated to the hospital.  She was brought to [facility] and was discovered to have multiple thoracic spinous process fractures as well as an L1 burst fracture.  Neurosurgery was consulted for evaluation.  On further questioning, patient reports midline axial back pain.  She denies any numbness, weakness, tingling in her lower extremities.  Castro catheter was placed for urinary retention.  She has been kept in logroll precautions and T-L she presents with her precautions.  To parents.  Notably, she lives in [place], but has been traveling with her van for the last several months.        Medications:  Current Facility-Administered Medications   Medication Dose Route Frequency Provider Last Rate Last Admin    Respiratory Therapy Consult   Nebulization Continuous RT Carito Colorado M.D.        Pharmacy Consult Request ...Pain Management Review 1 Each  1 Each Other PHARMACY TO DOSE Carito Colorado M.D.        ondansetron (Zofran) syringe/vial injection 4 mg  4 mg Intravenous Q4HRS PRN Carito Colorado M.D.        ondansetron (Zofran ODT) dispertab 4 mg  4 mg Oral Q4HRS PRN Carito Colorado M.D.        docusate sodium (Colace) capsule 100 mg  100 mg Oral BID Carito Colorado M.D.   100 mg at 06/02/24 1945    senna-docusate (Pericolace Or Senokot S) 8.6-50 MG per tablet 1 Tablet  1 Tablet Oral Nightly Carito Colorado M.D.        senna-docusate (Pericolace Or Senokot S) 8.6-50 MG per tablet 1 Tablet  1 Tablet Oral Q24HRS PRN Carito Colorado M.D.         polyethylene glycol/lytes (Miralax) Packet 1 Packet  1 Packet Oral BID Carito Colorado M.D.        magnesium hydroxide (Milk Of Magnesia) suspension 30 mL  30 mL Oral DAILY AT 1800 Carito Colorado M.D.        bisacodyl (Dulcolax) suppository 10 mg  10 mg Rectal Q24HRS PRN Carito Colorado M.D.        sodium phosphate (Fleet) enema 133 mL  1 Each Rectal Once PRN Carito Colorado M.D.        LR infusion   Intravenous Continuous Carito Colorado M.D. 83 mL/hr at 06/02/24 1943 New Bag at 06/02/24 1943    acetaminophen (Tylenol) tablet 1,000 mg  1,000 mg Oral Q6HRS Carito Colorado M.D.   1,000 mg at 06/02/24 1946    Followed by    [START ON 6/7/2024] acetaminophen (Tylenol) tablet 1,000 mg  1,000 mg Oral Q6HRS PRN Carito Colorado M.D.        oxyCODONE immediate-release (Roxicodone) tablet 5 mg  5 mg Oral Q3HRS PRN Carito Colorado M.D.        Or    oxyCODONE immediate release (Roxicodone) tablet 10 mg  10 mg Oral Q3HRS PRN Carito Colorado M.D.        Or    HYDROmorphone (Dilaudid) injection 0.5 mg  0.5 mg Intravenous Q HOUR PRN Carito Colorado M.D.        gabapentin (Neurontin) capsule 100 mg  100 mg Oral Q8HRS Carito Colorado M.D.   100 mg at 06/02/24 1945    metaxalone (Skelaxin) tablet 800 mg  800 mg Oral TID Carito Colorado M.D.   800 mg at 06/02/24 1945    famotidine (Pepcid) tablet 20 mg  20 mg Enteral Tube BID Carito Colorado M.D.   20 mg at 06/02/24 1947    Or    famotidine (Pepcid) injection 20 mg  20 mg Intravenous BID Carito Colorado M.D.        bacitracin-polymyxin b (Polysporin) 500-37510 UNIT/GM ointment   Topical TID Carito Colorado M.D.           Allergies:  No Known Allergies    Past Medical History:  History reviewed. No pertinent past medical history.    Past Surgical History:  History reviewed. No pertinent surgical history.    Family History:  History reviewed. No pertinent family history.    Social History:  Social History     Socioeconomic History    Marital status: Not on  file     Spouse name: Not on file    Number of children: Not on file    Years of education: Not on file    Highest education level: Not on file   Occupational History    Not on file   Tobacco Use    Smoking status: Never    Smokeless tobacco: Never   Vaping Use    Vaping status: Never Used   Substance and Sexual Activity    Alcohol use: Not Currently     Comment: occ    Drug use: Never    Sexual activity: Not on file   Other Topics Concern    Not on file   Social History Narrative    Not on file     Social Determinants of Health     Financial Resource Strain: Not on file   Food Insecurity: Not on file   Transportation Needs: Not on file   Physical Activity: Not on file   Stress: Not on file   Social Connections: Not on file   Intimate Partner Violence: Not on file   Housing Stability: Not on file       Physical Examination:  Vitals:    06/02/24 1815   BP: 114/58   Pulse:    Resp:    Temp:    SpO2:      The patient is resting comfortably in bed in no acute distress.    Neurological  Awake, alert, oriented x3. CN II-XII intact.    C-T-L spine precautions.  Logrolled, midline spinal tenderness worse in the mid thoracic spine as well as around L1-L2.    Motor  RLE  LLE  Iliopsoas     5/5     5/5  Quadriceps     5/5     5/5   Dorsiflexion                   5/5     5/5  Eversion                        5/5     5/5   EHL                               5/5     5/5  Plantarflexion                 5/5     5/5  Hamstrings                     5/5     5/5    Sensation  Sensation to light touch intact in all sensory dermatomes in lower extremities.    Reflexes          RLE  LLE  Patella    2/4+     2/4+  Achilles   2/4+     2/4+    Clonus absent     C-collar cleared at bedside.  Minimal midline and paraspinal neck tenderness, full range of motion without significant pain.    Labs:  Recent Labs     06/02/24  1601   WBC 19.2*   RBC 4.06*   HEMOGLOBIN 13.4   HEMATOCRIT 39.3   MCV 96.8   MCH 33.0   MCHC 34.1   RDW 42.5   PLATELETCT 287    MPV 9.2     Recent Labs     06/02/24  1601   SODIUM 137   POTASSIUM 3.3*   CHLORIDE 103   CO2 16*   GLUCOSE 151*   BUN 20   CREATININE 0.65   CALCIUM 8.8     Recent Labs     06/02/24  1601   APTT 26.9   INR 1.14*           Intake/Output                         06/01/24 0700 - 06/02/24 0659 (Not Admitted) 06/02/24 0700 - 06/03/24 0659     0700-1859 1900-0659 Total 0700-1859 1900-0659 Total                 Intake    I.V.  --  -- --  600  -- 600    Pre-Hospital Volume -- -- -- 600 -- 600    Trauma Resuscitation Volume -- -- -- 0 -- 0    Blood  --  -- --  0  -- 0    PRBC Total Volume (Non-Barcoded) -- -- -- 0 -- 0    FFP Total Volume (Non-Barcoded) -- -- -- 0 -- 0    Platelets Total Volume (Non-Barcoded) -- -- -- 0 -- 0    Cryoprecipitate (Pooled) Total Volume (Non-Barcoded) -- -- -- 0 -- 0    Total Intake -- -- -- 600 -- 600       Output    Other  --  -- --  0  -- 0    Pre-Hospital Output -- -- -- 0 -- 0    Trauma Resuscitation Output -- -- -- 0 -- 0    Blood  --  -- --  0  -- 0    Est. Blood Loss -- -- -- 0 -- 0    Total Output -- -- -- 0 -- 0       Net I/O     -- -- -- 600 -- 600            Imaging:    CT cervical spine without contrast dated 6/2/2024 was independently reviewed in detail, and my interpretation is as follows. No acute fracture or subluxation.     CT thoracic spine without contrast dated 6/2/2024 was independently reviewed in detail, and my interpretation is as follows. 1.  Acute 3 column L1 fracture with burst type fracture of the vertebral body and mild bony retropulsion. Bilateral facet and left lamina fracture.  2.  Acute T2-T8 spinous process fractures, several of which are displaced.  3.  Acute right T8 transverse process fracture.    CT lumbar spine without contrast dated 6/2/2024 was independently reviewed in detail, and my interpretation is as follows. No acute fracture or subluxation. 1. Comminuted 3 column L1 fracture with burst type fracture of the vertebral body with approximately 25%  loss of height, mild bony retropulsion. Bilateral facet and left lamina fracture and mild subluxation of the right T12-L1 facet joint.     CT head without contrast dated 6/2/2024 was independently reviewed in detail, and my interpretation is as follows. No acute intracranial abnormality.    MRI lumbar spine without contrast dated 6/2/2024 was independently reviewed in detail,  and my interpretation is as follows. L1 burst fracture, minimal retropulsion into the spinal canal.  There is subluxation of the right T12-L1 joint.  No obvious posterior ligamentous injury.    Assessment and Plan:    Willie Armenta is a 28 y.o. female presenting with multiple thoracic spinous process fractures, a 3-column burst fracture at L1, mild loss of height, mild retropulsion, T12-L1 right joint subluxation, KALI E, TLICS 4.     Chemical prophylactic DVT therapy: Yes  Start date/time: 6/2    Recommendations:  - Strict bedrest until able to secure/don TLSO brace tomorrow  - Continue log-roll precautions until TLSO at bedside  - Plan to mobilize tomorrow with PT and obtain upright XR in TLSO brace. If x-rays show worsening kyphosis, and/or axial back pain is intolerable with weightbearing, then we will plan for posterior fusion on Tuesday.  - OK for diet tonight  - C-collar cleared    Discussed with Dr. Colorado.    Thank you for this consult. Please call with questions.    Angely Solomon M.D.  [facility]  [address] [place] [id number]  [phone]    A total of 55 minutes were spent in the evaluation, examination, coordination of care, review of labs and imaging of this patient. I spent >50% of time face-to-face on patient counseling.

## 2024-06-03 NOTE — ASSESSMENT & PLAN NOTE
Date of admission: 6/2/2024.  6/4 Transfer orders from [facility].  6/5 Therapies recommend home health. Patient plans to return to [place] upon discharge. She has set up a follow up appt with her PCP.  Cleared for discharge: Yes - Date: 6/7 .  Discharge delayed: No.  Discharge date: tbd.  Lives in [place].

## 2024-06-04 ENCOUNTER — ANESTHESIA EVENT (OUTPATIENT)
Dept: SURGERY | Facility: MEDICAL CENTER | Age: 29
DRG: 460 | End: 2024-06-04
Payer: COMMERCIAL

## 2024-06-04 ENCOUNTER — ANESTHESIA (OUTPATIENT)
Dept: SURGERY | Facility: MEDICAL CENTER | Age: 29
DRG: 460 | End: 2024-06-04
Payer: COMMERCIAL

## 2024-06-04 ENCOUNTER — APPOINTMENT (OUTPATIENT)
Dept: RADIOLOGY | Facility: MEDICAL CENTER | Age: 29
DRG: 460 | End: 2024-06-04
Attending: NEUROLOGICAL SURGERY
Payer: COMMERCIAL

## 2024-06-04 LAB
ALBUMIN SERPL BCP-MCNC: 3.6 G/DL (ref 3.2–4.9)
ALBUMIN/GLOB SERPL: 1.5 G/DL
ALP SERPL-CCNC: 35 U/L (ref 30–99)
ALT SERPL-CCNC: 13 U/L (ref 2–50)
ANION GAP SERPL CALC-SCNC: 10 MMOL/L (ref 7–16)
AST SERPL-CCNC: 28 U/L (ref 12–45)
BASOPHILS # BLD AUTO: 0.4 % (ref 0–1.8)
BASOPHILS # BLD: 0.03 K/UL (ref 0–0.12)
BILIRUB SERPL-MCNC: 0.5 MG/DL (ref 0.1–1.5)
BUN SERPL-MCNC: 10 MG/DL (ref 8–22)
CALCIUM ALBUM COR SERPL-MCNC: 8.6 MG/DL (ref 8.5–10.5)
CALCIUM SERPL-MCNC: 8.3 MG/DL (ref 8.5–10.5)
CHLORIDE SERPL-SCNC: 108 MMOL/L (ref 96–112)
CO2 SERPL-SCNC: 22 MMOL/L (ref 20–33)
CREAT SERPL-MCNC: 0.52 MG/DL (ref 0.5–1.4)
EOSINOPHIL # BLD AUTO: 0.1 K/UL (ref 0–0.51)
EOSINOPHIL NFR BLD: 1.3 % (ref 0–6.9)
ERYTHROCYTE [DISTWIDTH] IN BLOOD BY AUTOMATED COUNT: 44.6 FL (ref 35.9–50)
GFR SERPLBLD CREATININE-BSD FMLA CKD-EPI: 130 ML/MIN/1.73 M 2
GLOBULIN SER CALC-MCNC: 2.4 G/DL (ref 1.9–3.5)
GLUCOSE SERPL-MCNC: 96 MG/DL (ref 65–99)
HCT VFR BLD AUTO: 34.7 % (ref 37–47)
HGB BLD-MCNC: 11.9 G/DL (ref 12–16)
IMM GRANULOCYTES # BLD AUTO: 0.02 K/UL (ref 0–0.11)
IMM GRANULOCYTES NFR BLD AUTO: 0.3 % (ref 0–0.9)
LYMPHOCYTES # BLD AUTO: 1.66 K/UL (ref 1–4.8)
LYMPHOCYTES NFR BLD: 21.3 % (ref 22–41)
MCH RBC QN AUTO: 32.9 PG (ref 27–33)
MCHC RBC AUTO-ENTMCNC: 34.3 G/DL (ref 32.2–35.5)
MCV RBC AUTO: 95.9 FL (ref 81.4–97.8)
MONOCYTES # BLD AUTO: 0.5 K/UL (ref 0–0.85)
MONOCYTES NFR BLD AUTO: 6.4 % (ref 0–13.4)
NEUTROPHILS # BLD AUTO: 5.47 K/UL (ref 1.82–7.42)
NEUTROPHILS NFR BLD: 70.3 % (ref 44–72)
NRBC # BLD AUTO: 0 K/UL
NRBC BLD-RTO: 0 /100 WBC (ref 0–0.2)
PLATELET # BLD AUTO: 214 K/UL (ref 164–446)
PMV BLD AUTO: 9 FL (ref 9–12.9)
POTASSIUM SERPL-SCNC: 4 MMOL/L (ref 3.6–5.5)
PROT SERPL-MCNC: 6 G/DL (ref 6–8.2)
RBC # BLD AUTO: 3.62 M/UL (ref 4.2–5.4)
SODIUM SERPL-SCNC: 140 MMOL/L (ref 135–145)
WBC # BLD AUTO: 7.8 K/UL (ref 4.8–10.8)

## 2024-06-04 PROCEDURE — 0RGA0K1 FUSION OF THORACOLUMBAR VERTEBRAL JOINT WITH NONAUTOLOGOUS TISSUE SUBSTITUTE, POSTERIOR APPROACH, POSTERIOR COLUMN, OPEN APPROACH: ICD-10-PCS | Performed by: NEUROLOGICAL SURGERY

## 2024-06-04 PROCEDURE — 700105 HCHG RX REV CODE 258: Performed by: ANESTHESIOLOGY

## 2024-06-04 PROCEDURE — 160036 HCHG PACU - EA ADDL 30 MINS PHASE I: Performed by: NEUROLOGICAL SURGERY

## 2024-06-04 PROCEDURE — 80053 COMPREHEN METABOLIC PANEL: CPT

## 2024-06-04 PROCEDURE — 502000 HCHG MISC OR IMPLANTS RC 0278: Performed by: NEUROLOGICAL SURGERY

## 2024-06-04 PROCEDURE — A9270 NON-COVERED ITEM OR SERVICE: HCPCS

## 2024-06-04 PROCEDURE — 85025 COMPLETE CBC W/AUTO DIFF WBC: CPT

## 2024-06-04 PROCEDURE — 110454 HCHG SHELL REV 250: Performed by: NEUROLOGICAL SURGERY

## 2024-06-04 PROCEDURE — 700101 HCHG RX REV CODE 250: Performed by: NEUROLOGICAL SURGERY

## 2024-06-04 PROCEDURE — 160009 HCHG ANES TIME/MIN: Performed by: NEUROLOGICAL SURGERY

## 2024-06-04 PROCEDURE — 700102 HCHG RX REV CODE 250 W/ 637 OVERRIDE(OP)

## 2024-06-04 PROCEDURE — 72080 X-RAY EXAM THORACOLMB 2/> VW: CPT

## 2024-06-04 PROCEDURE — A9270 NON-COVERED ITEM OR SERVICE: HCPCS | Performed by: SURGERY

## 2024-06-04 PROCEDURE — 700111 HCHG RX REV CODE 636 W/ 250 OVERRIDE (IP): Mod: JZ | Performed by: SURGERY

## 2024-06-04 PROCEDURE — 700102 HCHG RX REV CODE 250 W/ 637 OVERRIDE(OP): Performed by: SURGERY

## 2024-06-04 PROCEDURE — 160031 HCHG SURGERY MINUTES - 1ST 30 MINS LEVEL 5: Performed by: NEUROLOGICAL SURGERY

## 2024-06-04 PROCEDURE — 8E0WXBZ COMPUTER ASSISTED PROCEDURE OF TRUNK REGION: ICD-10-PCS | Performed by: NEUROLOGICAL SURGERY

## 2024-06-04 PROCEDURE — 160035 HCHG PACU - 1ST 60 MINS PHASE I: Performed by: NEUROLOGICAL SURGERY

## 2024-06-04 PROCEDURE — 160042 HCHG SURGERY MINUTES - EA ADDL 1 MIN LEVEL 5: Performed by: NEUROLOGICAL SURGERY

## 2024-06-04 PROCEDURE — 700101 HCHG RX REV CODE 250: Performed by: ANESTHESIOLOGY

## 2024-06-04 PROCEDURE — 160048 HCHG OR STATISTICAL LEVEL 1-5: Performed by: NEUROLOGICAL SURGERY

## 2024-06-04 PROCEDURE — 770001 HCHG ROOM/CARE - MED/SURG/GYN PRIV*

## 2024-06-04 PROCEDURE — 700111 HCHG RX REV CODE 636 W/ 250 OVERRIDE (IP): Performed by: ANESTHESIOLOGY

## 2024-06-04 PROCEDURE — 3E0U0GB INTRODUCTION OF RECOMBINANT BONE MORPHOGENETIC PROTEIN INTO JOINTS, OPEN APPROACH: ICD-10-PCS | Performed by: NEUROLOGICAL SURGERY

## 2024-06-04 PROCEDURE — C1713 ANCHOR/SCREW BN/BN,TIS/BN: HCPCS | Performed by: NEUROLOGICAL SURGERY

## 2024-06-04 PROCEDURE — 99232 SBSQ HOSP IP/OBS MODERATE 35: CPT | Performed by: NURSE PRACTITIONER

## 2024-06-04 PROCEDURE — 160002 HCHG RECOVERY MINUTES (STAT): Performed by: NEUROLOGICAL SURGERY

## 2024-06-04 PROCEDURE — 0SG00K1 FUSION OF LUMBAR VERTEBRAL JOINT WITH NONAUTOLOGOUS TISSUE SUBSTITUTE, POSTERIOR APPROACH, POSTERIOR COLUMN, OPEN APPROACH: ICD-10-PCS | Performed by: NEUROLOGICAL SURGERY

## 2024-06-04 DEVICE — GRAPH BONE KIT INFUSE SMALL: Type: IMPLANTABLE DEVICE | Site: SPINE THORACIC | Status: FUNCTIONAL

## 2024-06-04 DEVICE — ROD PREBENT TITANIUM 5.5 X 70MM (2TCONX2=4): Type: IMPLANTABLE DEVICE | Site: SPINE THORACIC | Status: FUNCTIONAL

## 2024-06-04 DEVICE — IMPLANTABLE DEVICE: Type: IMPLANTABLE DEVICE | Site: SPINE THORACIC | Status: FUNCTIONAL

## 2024-06-04 DEVICE — ROD PREBENT TITANIUM 5.5 X 80MM (2TCONX2=4): Type: IMPLANTABLE DEVICE | Site: SPINE THORACIC | Status: FUNCTIONAL

## 2024-06-04 RX ORDER — HYDROMORPHONE HYDROCHLORIDE 2 MG/ML
INJECTION, SOLUTION INTRAMUSCULAR; INTRAVENOUS; SUBCUTANEOUS PRN
Status: DISCONTINUED | OUTPATIENT
Start: 2024-06-04 | End: 2024-06-04 | Stop reason: HOSPADM

## 2024-06-04 RX ORDER — SODIUM CHLORIDE, SODIUM LACTATE, POTASSIUM CHLORIDE, CALCIUM CHLORIDE 600; 310; 30; 20 MG/100ML; MG/100ML; MG/100ML; MG/100ML
INJECTION, SOLUTION INTRAVENOUS
Status: DISCONTINUED | OUTPATIENT
Start: 2024-06-04 | End: 2024-06-04 | Stop reason: HOSPADM

## 2024-06-04 RX ORDER — DEXAMETHASONE SODIUM PHOSPHATE 4 MG/ML
INJECTION, SOLUTION INTRA-ARTICULAR; INTRALESIONAL; INTRAMUSCULAR; INTRAVENOUS; SOFT TISSUE PRN
Status: DISCONTINUED | OUTPATIENT
Start: 2024-06-04 | End: 2024-06-04 | Stop reason: SURG

## 2024-06-04 RX ORDER — HALOPERIDOL 5 MG/ML
1 INJECTION INTRAMUSCULAR
Status: DISCONTINUED | OUTPATIENT
Start: 2024-06-04 | End: 2024-06-04 | Stop reason: HOSPADM

## 2024-06-04 RX ORDER — KETOROLAC TROMETHAMINE 15 MG/ML
INJECTION, SOLUTION INTRAMUSCULAR; INTRAVENOUS PRN
Status: DISCONTINUED | OUTPATIENT
Start: 2024-06-04 | End: 2024-06-04 | Stop reason: SURG

## 2024-06-04 RX ORDER — ONDANSETRON 2 MG/ML
INJECTION INTRAMUSCULAR; INTRAVENOUS PRN
Status: DISCONTINUED | OUTPATIENT
Start: 2024-06-04 | End: 2024-06-04 | Stop reason: SURG

## 2024-06-04 RX ORDER — HYDROMORPHONE HYDROCHLORIDE 1 MG/ML
0.4 INJECTION, SOLUTION INTRAMUSCULAR; INTRAVENOUS; SUBCUTANEOUS
Status: DISCONTINUED | OUTPATIENT
Start: 2024-06-04 | End: 2024-06-04 | Stop reason: HOSPADM

## 2024-06-04 RX ORDER — MIDAZOLAM HYDROCHLORIDE 1 MG/ML
INJECTION INTRAMUSCULAR; INTRAVENOUS PRN
Status: DISCONTINUED | OUTPATIENT
Start: 2024-06-04 | End: 2024-06-04 | Stop reason: SURG

## 2024-06-04 RX ORDER — OXYCODONE HCL 5 MG/5 ML
10 SOLUTION, ORAL ORAL
Status: DISCONTINUED | OUTPATIENT
Start: 2024-06-04 | End: 2024-06-04 | Stop reason: HOSPADM

## 2024-06-04 RX ORDER — MIDAZOLAM HYDROCHLORIDE 1 MG/ML
1 INJECTION INTRAMUSCULAR; INTRAVENOUS
Status: DISCONTINUED | OUTPATIENT
Start: 2024-06-04 | End: 2024-06-04 | Stop reason: HOSPADM

## 2024-06-04 RX ORDER — SODIUM CHLORIDE, SODIUM GLUCONATE, SODIUM ACETATE, POTASSIUM CHLORIDE AND MAGNESIUM CHLORIDE 526; 502; 368; 37; 30 MG/100ML; MG/100ML; MG/100ML; MG/100ML; MG/100ML
500 INJECTION, SOLUTION INTRAVENOUS CONTINUOUS
Status: DISCONTINUED | OUTPATIENT
Start: 2024-06-04 | End: 2024-06-04 | Stop reason: HOSPADM

## 2024-06-04 RX ORDER — OXYCODONE HCL 5 MG/5 ML
5 SOLUTION, ORAL ORAL
Status: DISCONTINUED | OUTPATIENT
Start: 2024-06-04 | End: 2024-06-04 | Stop reason: HOSPADM

## 2024-06-04 RX ORDER — MEPERIDINE HYDROCHLORIDE 25 MG/ML
12.5 INJECTION INTRAMUSCULAR; INTRAVENOUS; SUBCUTANEOUS
Status: DISCONTINUED | OUTPATIENT
Start: 2024-06-04 | End: 2024-06-04 | Stop reason: HOSPADM

## 2024-06-04 RX ORDER — SODIUM CHLORIDE, SODIUM GLUCONATE, SODIUM ACETATE, POTASSIUM CHLORIDE AND MAGNESIUM CHLORIDE 526; 502; 368; 37; 30 MG/100ML; MG/100ML; MG/100ML; MG/100ML; MG/100ML
INJECTION, SOLUTION INTRAVENOUS
Status: DISCONTINUED | OUTPATIENT
Start: 2024-06-04 | End: 2024-06-04 | Stop reason: SURG

## 2024-06-04 RX ORDER — GLYCOPYRROLATE 0.2 MG/ML
INJECTION INTRAMUSCULAR; INTRAVENOUS PRN
Status: DISCONTINUED | OUTPATIENT
Start: 2024-06-04 | End: 2024-06-04 | Stop reason: HOSPADM

## 2024-06-04 RX ORDER — DIPHENHYDRAMINE HYDROCHLORIDE 50 MG/ML
12.5 INJECTION INTRAMUSCULAR; INTRAVENOUS
Status: DISCONTINUED | OUTPATIENT
Start: 2024-06-04 | End: 2024-06-04 | Stop reason: HOSPADM

## 2024-06-04 RX ORDER — IPRATROPIUM BROMIDE AND ALBUTEROL SULFATE 2.5; .5 MG/3ML; MG/3ML
3 SOLUTION RESPIRATORY (INHALATION)
Status: DISCONTINUED | OUTPATIENT
Start: 2024-06-04 | End: 2024-06-04 | Stop reason: HOSPADM

## 2024-06-04 RX ORDER — LIDOCAINE HYDROCHLORIDE 20 MG/ML
INJECTION, SOLUTION EPIDURAL; INFILTRATION; INTRACAUDAL; PERINEURAL PRN
Status: DISCONTINUED | OUTPATIENT
Start: 2024-06-04 | End: 2024-06-04 | Stop reason: SURG

## 2024-06-04 RX ORDER — BUPIVACAINE HYDROCHLORIDE AND EPINEPHRINE 5; 5 MG/ML; UG/ML
INJECTION, SOLUTION EPIDURAL; INTRACAUDAL; PERINEURAL
Status: DISCONTINUED | OUTPATIENT
Start: 2024-06-04 | End: 2024-06-04 | Stop reason: HOSPADM

## 2024-06-04 RX ORDER — ROCURONIUM BROMIDE 10 MG/ML
INJECTION, SOLUTION INTRAVENOUS PRN
Status: DISCONTINUED | OUTPATIENT
Start: 2024-06-04 | End: 2024-06-04 | Stop reason: SURG

## 2024-06-04 RX ORDER — CEFAZOLIN SODIUM 1 G/3ML
INJECTION, POWDER, FOR SOLUTION INTRAMUSCULAR; INTRAVENOUS PRN
Status: DISCONTINUED | OUTPATIENT
Start: 2024-06-04 | End: 2024-06-04 | Stop reason: SURG

## 2024-06-04 RX ORDER — HYDROMORPHONE HYDROCHLORIDE 1 MG/ML
1 INJECTION, SOLUTION INTRAMUSCULAR; INTRAVENOUS; SUBCUTANEOUS
Status: DISCONTINUED | OUTPATIENT
Start: 2024-06-04 | End: 2024-06-04 | Stop reason: HOSPADM

## 2024-06-04 RX ORDER — HYDROMORPHONE HYDROCHLORIDE 1 MG/ML
0.2 INJECTION, SOLUTION INTRAMUSCULAR; INTRAVENOUS; SUBCUTANEOUS
Status: DISCONTINUED | OUTPATIENT
Start: 2024-06-04 | End: 2024-06-04 | Stop reason: HOSPADM

## 2024-06-04 RX ORDER — LIDOCAINE HYDROCHLORIDE 40 MG/ML
SOLUTION TOPICAL PRN
Status: DISCONTINUED | OUTPATIENT
Start: 2024-06-04 | End: 2024-06-04 | Stop reason: SURG

## 2024-06-04 RX ORDER — ONDANSETRON 2 MG/ML
4 INJECTION INTRAMUSCULAR; INTRAVENOUS
Status: DISCONTINUED | OUTPATIENT
Start: 2024-06-04 | End: 2024-06-04 | Stop reason: HOSPADM

## 2024-06-04 RX ADMIN — ACETAMINOPHEN 1000 MG: 500 TABLET, FILM COATED ORAL at 23:11

## 2024-06-04 RX ADMIN — DEXTROMETHORPHAN HYDROBROMIDE, GUAIFENESIN, PHENYLEPHRINE HYDROCHLORIDE: 20; 200; 10 SOLUTION ORAL at 17:58

## 2024-06-04 RX ADMIN — TIZANIDINE 4 MG: 4 TABLET ORAL at 17:58

## 2024-06-04 RX ADMIN — GABAPENTIN 100 MG: 100 CAPSULE ORAL at 23:11

## 2024-06-04 RX ADMIN — HYDROMORPHONE HYDROCHLORIDE 0.5 MG: 2 INJECTION INTRAMUSCULAR; INTRAVENOUS; SUBCUTANEOUS at 15:04

## 2024-06-04 RX ADMIN — LIDOCAINE HYDROCHLORIDE 4 MG: 20 INJECTION, SOLUTION EPIDURAL; INFILTRATION; INTRACAUDAL at 13:39

## 2024-06-04 RX ADMIN — DOCUSATE SODIUM 100 MG: 100 CAPSULE, LIQUID FILLED ORAL at 05:19

## 2024-06-04 RX ADMIN — CEFAZOLIN 2 G: 1 INJECTION, POWDER, FOR SOLUTION INTRAMUSCULAR; INTRAVENOUS at 13:38

## 2024-06-04 RX ADMIN — HYDROMORPHONE HYDROCHLORIDE 1 MG: 2 INJECTION INTRAMUSCULAR; INTRAVENOUS; SUBCUTANEOUS at 14:12

## 2024-06-04 RX ADMIN — SODIUM CHLORIDE, SODIUM GLUCONATE, SODIUM ACETATE, POTASSIUM CHLORIDE AND MAGNESIUM CHLORIDE: 526; 502; 368; 37; 30 INJECTION, SOLUTION INTRAVENOUS at 15:12

## 2024-06-04 RX ADMIN — METAXALONE 800 MG: 800 TABLET ORAL at 11:10

## 2024-06-04 RX ADMIN — ACETAMINOPHEN 1000 MG: 500 TABLET, FILM COATED ORAL at 00:04

## 2024-06-04 RX ADMIN — ESCITALOPRAM OXALATE 10 MG: 10 TABLET ORAL at 05:20

## 2024-06-04 RX ADMIN — KETOROLAC TROMETHAMINE 15 MG: 15 INJECTION, SOLUTION INTRAMUSCULAR; INTRAVENOUS at 15:30

## 2024-06-04 RX ADMIN — ACETAMINOPHEN 1000 MG: 500 TABLET, FILM COATED ORAL at 05:19

## 2024-06-04 RX ADMIN — ONDANSETRON 4 MG: 2 INJECTION INTRAMUSCULAR; INTRAVENOUS at 08:51

## 2024-06-04 RX ADMIN — ROCURONIUM BROMIDE 80 MG: 50 INJECTION, SOLUTION INTRAVENOUS at 13:39

## 2024-06-04 RX ADMIN — FAMOTIDINE 20 MG: 20 TABLET, FILM COATED ORAL at 06:00

## 2024-06-04 RX ADMIN — OXYCODONE HYDROCHLORIDE 5 MG: 5 TABLET ORAL at 23:11

## 2024-06-04 RX ADMIN — METAXALONE 800 MG: 800 TABLET ORAL at 05:19

## 2024-06-04 RX ADMIN — ACETAMINOPHEN 1000 MG: 500 TABLET, FILM COATED ORAL at 11:10

## 2024-06-04 RX ADMIN — DEXAMETHASONE SODIUM PHOSPHATE 8 MG: 4 INJECTION INTRA-ARTICULAR; INTRALESIONAL; INTRAMUSCULAR; INTRAVENOUS; SOFT TISSUE at 13:44

## 2024-06-04 RX ADMIN — PROPOFOL 140 MG: 10 INJECTION, EMULSION INTRAVENOUS at 13:39

## 2024-06-04 RX ADMIN — LIDOCAINE HYDROCHLORIDE 4 ML: 40 SOLUTION TOPICAL at 13:39

## 2024-06-04 RX ADMIN — MIDAZOLAM HYDROCHLORIDE 2 MG: 1 INJECTION, SOLUTION INTRAMUSCULAR; INTRAVENOUS at 13:32

## 2024-06-04 RX ADMIN — SUGAMMADEX 200 MG: 100 INJECTION, SOLUTION INTRAVENOUS at 15:24

## 2024-06-04 RX ADMIN — GABAPENTIN 100 MG: 100 CAPSULE ORAL at 05:20

## 2024-06-04 RX ADMIN — ONDANSETRON 4 MG: 2 INJECTION INTRAMUSCULAR; INTRAVENOUS at 15:30

## 2024-06-04 RX ADMIN — DOCUSATE SODIUM 100 MG: 100 CAPSULE, LIQUID FILLED ORAL at 17:58

## 2024-06-04 RX ADMIN — GLYCOPYRROLATE 0.2 MG: 0.2 INJECTION INTRAMUSCULAR; INTRAVENOUS at 13:35

## 2024-06-04 RX ADMIN — POLYETHYLENE GLYCOL 3350 1 PACKET: 17 POWDER, FOR SOLUTION ORAL at 17:57

## 2024-06-04 RX ADMIN — ACETAMINOPHEN 1000 MG: 500 TABLET, FILM COATED ORAL at 17:57

## 2024-06-04 RX ADMIN — SODIUM CHLORIDE, POTASSIUM CHLORIDE, SODIUM LACTATE AND CALCIUM CHLORIDE: 600; 310; 30; 20 INJECTION, SOLUTION INTRAVENOUS at 13:30

## 2024-06-04 RX ADMIN — FENTANYL CITRATE 100 MCG: 50 INJECTION, SOLUTION INTRAMUSCULAR; INTRAVENOUS at 13:32

## 2024-06-04 RX ADMIN — TIZANIDINE 4 MG: 4 TABLET ORAL at 23:11

## 2024-06-04 RX ADMIN — DEXTROMETHORPHAN HYDROBROMIDE, GUAIFENESIN, PHENYLEPHRINE HYDROCHLORIDE: 20; 200; 10 SOLUTION ORAL at 05:20

## 2024-06-04 RX ADMIN — GLYCOPYRROLATE 0.2 MG: 0.2 INJECTION INTRAMUSCULAR; INTRAVENOUS at 15:27

## 2024-06-04 RX ADMIN — Medication 50 MG: at 13:57

## 2024-06-04 RX ADMIN — DEXTROMETHORPHAN HYDROBROMIDE, GUAIFENESIN, PHENYLEPHRINE HYDROCHLORIDE: 20; 200; 10 SOLUTION ORAL at 11:10

## 2024-06-04 ASSESSMENT — ENCOUNTER SYMPTOMS
EYES NEGATIVE: 1
NECK PAIN: 0
NEUROLOGICAL NEGATIVE: 1
PSYCHIATRIC NEGATIVE: 1
CARDIOVASCULAR NEGATIVE: 1
MYALGIAS: 1
GASTROINTESTINAL NEGATIVE: 1
BACK PAIN: 1
RESPIRATORY NEGATIVE: 1

## 2024-06-04 ASSESSMENT — PAIN DESCRIPTION - PAIN TYPE
TYPE: ACUTE PAIN;SURGICAL PAIN
TYPE: ACUTE PAIN;SURGICAL PAIN
TYPE: SURGICAL PAIN
TYPE: ACUTE PAIN

## 2024-06-04 ASSESSMENT — PAIN SCALES - GENERAL: PAIN_LEVEL: 0

## 2024-06-04 NOTE — ANESTHESIA PROCEDURE NOTES
Airway    Date/Time: 6/4/2024 1:39 PM    Performed by: Jone Lilly III, M.D.  Authorized by: Jone Lilly III, M.D.    Location:  OR  Urgency:  Elective  Difficult Airway: No    Indications for Airway Management:  Anesthesia      Spontaneous Ventilation: absent    Sedation Level:  Deep  Preoxygenated: Yes    Patient Position:  Sniffing  Final Airway Type:  Endotracheal airway  Final Endotracheal Airway:  ETT  Cuffed: Yes    Technique Used for Successful ETT Placement:  Direct laryngoscopy    Insertion Site:  Oral  Blade Type:  Leonard  Laryngoscope Blade/Videolaryngoscope Blade Size:  2  ETT Size (mm):  7.0  Measured from:  Lips  ETT to Lips (cm):  21  Placement Verified by: auscultation and capnometry    Cormack-Lehane Classification:  Grade IIb - view of arytenoids or posterior of glottis only  Number of Attempts at Approach:  1

## 2024-06-04 NOTE — OP REPORT
NEUROSURGERY OPERATIVE NOTE    Patient Name: Tracy Hauser MRN: [id number] YOB: 1995  Date of Surgery: 6/4/2024     SURGEON: Angely Solomon M.D.    ASSISTANT: KADEN Nieves    PRE-OP DIAGNOSIS:  1.  Acute L1 compression/burst fracture, 3 column injury with bilateral T12-L1 facet fractures  2.  Traumatic kyphosis and scoliosis  3.  Thoracolumbar instability     POST-OP DIAGNOSIS:  1.  Acute L1 compression/burst fracture, 3 column injury with bilateral T12-L1 facet fractures  2.  Traumatic kyphosis and scoliosis  3.  Thoracolumbar instability           PROCEDURE:  Open reduction, internal fixation of T12/L1 fractures with posterolateral instrumentation at T12-L1, L1-L2  Posterolateral arthrodesis at T12-L1 and L1-2 with use of BMP, allograft.  Use of intraoperative stereotactic neuronavigation with [employer] Stealth system           ANESTHESIA: GETA           ESTIMATED BLOOD LOSS: 100cc           DRAINS: Hemovac x 1           SPECIMENS: * No specimens in log *           IMPLANTS: [employer] Solera system  T12 left 6.5x 50mm screw, right 6.5 x 45 mm screw  L1 left 5.5 x 45 mm screw, right 5.5 x 40 mm screw  L2 left 6.5 x 50 mm screw right 6.5 x 50 mm screw  Left side 70mm titanium matteo  Right side 80mm titanium matteo  1cm x 10 cm Magnifuse  Small  InFuse (BMP)      COMPLICATIONS: None apparent    Operative Indications: The patient is a 28 year old woman presenting with a flexion/compression injury at T12-L1 after a rockclimbing accident and fall from height about 20 to 25 feet.  She is found to have an L1 compression/burst fracture with T12-L1 bilateral facet fractures.  She was trialed in a TLSO brace, but due to pain, and progressive coronal instability on standing x-rays, operative invention for open reduction and internal fixation was recommended. The indications, benefits, alternatives and risks to the procedure were discussed with the patient, which included but were not limited to bleeding,  infection, stroke, injury to nearby nerves and vessels, cerebrospinal fluid leak, new weakness or vision changes, worsened imbalance or discoordination, lack of improvement, hardware failure, need for additional procedures, adjacent segment disease. The patient was in agreement and wished to proceed.    Operative Details: The patient was identified in the pre-operative holding area by perioperative staff by two forms of identification. The patient was brought to the operating room, induced under general anesthesia and intubated without complication. Antibiotics were administered. IV access were placed by the Anesthesia team and nursing staff.  SCDs were turned on.  The patient was positioned prone on a Carson table with chest, hip, thigh pads.  The arms were positioned in a 90 degree superman position and were adequately supported and padded.  The knees were padded.  A midline incision over T12-L2 spinous processes was planned =using fluoroscopy.  The posterior back was clipped of hair, cleansed with isopropyl alcohol soaked 4 x 4 sponges, followed by ChloraPrep.  The patient was draped in the usual sterile fashion.  A formal timeout indicated the correct patient procedure and levels.      Marcaine 0.5% with epinephrine 1:200,000 was injected subcutaneously along the incision. Incision was made with a 10-blade. Monopolar cautery was used to dissect through the subcutaneous tissue and muscular fascia down to the lumbar spinous processes.  Obvious posterior ligamentous disruption was identified at the T12-L1 level, which was was confirmed with lateral fluoroscopy.  The spinous processes were dissected of soft tissue in the subperiosteal plane using a combination of monopolar cautery and Garcia instruments.  Dissection continued until the T11-12, T12-L1, L1-L2 facets were exposed bilaterally, taking care to preserve the capsule of T11-T12.  The T12, L1, L2 transverse processes were exposed, and the sacral ala was  exposed.    The neuronavigation clamp was then secured to the T11 spinous process.  A CT scan was obtained using the O-arm, which again confirmed the correct levels.  The neuronavigation passive frame was co-registered to the [employer] Stealth neuronavigation system.  The intraoperative CT scan was used to pre-plan the trajectory of the pedicle screws using the Stealth neuronavigation system. A navigated high-speed drill with an AM 8 drill bit was used to drill  holes in the lamina through the cortex to the cancellous bone bilaterally at T12, L1, L2 in the direction of a standard pedicle screw utilizing the pre-planned trajectories.  Each  hole was then tapped with a navigated tap instrument, and probed with a ball-tipped probe to ensure no medial or lateral breach. The screw instrumentation was then placed under neuronavigation bilaterally at T12, L1, L2 with the cortical bone trajectory, achieving good purchase at all screws.  Another O-arm spin confirmed excellent placement of the pedicle screws.  The patient had also somewhat reduced her T12-L1 fracture with simple positioning.  Titanium rods were fitted bilaterally.  First, the matteo was secured with locking caps and the tulip heads on the right side, and the T12-L1 level was distracted on the right side to perform open reduction of the coronal deformity.  The locking caps were tightly secured to perform internal fixation of this fracture.  The left-sided matteo was placed, and locking caps were placed for additional internal fixation.  Locking caps were final-tightened with a torque/counter-torque.  A high-speed drill was used to decorticate the transverse processes and lamina at T12, L1, L2 and the T12-L1, L1-2 facets for arthrodesis.  A small BMP was divided and placed in the posterolateral gutters, along with a 1 cm x 10 cm Magnifuse allograft to augment the arthrodesis.     Hemostasis was meticulously achieved. The field was again irrigated copiously  with Ancef irrigation.  A medium Hemovac was placed in the submuscular space, tunneled through the skin, and secured with a drain stitch.      The incision was closed in layers, using interrupted 0-Vicryl for muscle and fascia, interrupted inverted 2-0 Vicryl for deep dermal layers, and a running 4-0 Monocryl for the subcuticular stitch and Dermabond for the skin.  A Tegaderm dressing was placed.  The patient was turned supine on the hospital bed. The patient was extubated, and taken to the recovery room in a stable condition.    All counts were correct x2.     MORE Avendano was present for all parts of the surgery, including the incision, exposure, critical portions of the procedure and closure.    A first assistant was required for assistance with exposure, tissue retraction, suctioning and irrigating the field during open reduction, internal fixation, and closure.

## 2024-06-04 NOTE — ANESTHESIA PREPROCEDURE EVALUATION
Case: [id number] Date/Time: 06/04/24 1505    Procedure: FUSION, SPINE, THORACIC, POSTERIOR APPROACH, WITH O-ARM IMAGING GUIDANCE, T12-L2    Location: [facility] OR 06 / SURGERY [facility]    Surgeons: Angely Solomon M.D.            Relevant Problems   No relevant active problems       Physical Exam    Airway   Mallampati: II  TM distance: >3 FB  Neck ROM: full       Cardiovascular - normal exam  Rhythm: regular  Rate: normal  (-) murmur     Dental - normal exam           Pulmonary - normal exam  Breath sounds clear to auscultation     Abdominal    Neurological - normal exam                   Anesthesia Plan    ASA 1- EMERGENT       Plan - general       Airway plan will be ETT          Induction: intravenous    Postoperative Plan: Postoperative administration of opioids is intended.    Pertinent diagnostic labs and testing reviewed    Informed Consent:    Anesthetic plan and risks discussed with patient.    Use of blood products discussed with: patient whom consented to blood products.

## 2024-06-04 NOTE — ANESTHESIA PROCEDURE NOTES
Peripheral IV    Date/Time: 6/4/2024 1:47 PM    Performed by: Jone Lilly III, M.D.  Authorized by: Jone Lilly III, M.D.    Size:  18 G (short B Meng)  Laterality:  Right  Peripheral IV Location:  Hand  Local Anesthetic:  None  Site Prep:  Alcohol  Technique:  Direct puncture  Attempts:  1

## 2024-06-04 NOTE — PROGRESS NOTES
Neurosurgery Progress Note    Subjective:  No acute events overnight  Patient wearing TLSO brace, ambulating with physical therapy.  She is reporting severe at least 5/10 pain while standing brushing her teeth for several minutes.  No pain when she is lying down.  No numbness, tingling, weakness in her extremities.  Patient gets nauseous    Exam:  A&O  PERRL  Getting nauseated due to pain while standing at the sink  Ambulation is normal, unassisted  Wearing TLSO brace    BP  Min: 82/51  Max: 127/95  Pulse  Av.8  Min: 55  Max: 85  Resp  Av  Min: 9  Max: 44  Temp  Av.6 °C (97.8 °F)  Min: 36.4 °C (97.6 °F)  Max: 36.7 °C (98 °F)  Monitored Temp 2  Av.2 °C (99 °F)  Min: 37.2 °C (99 °F)  Max: 37.2 °C (99 °F)  SpO2  Av.4 %  Min: 91 %  Max: 100 %    No data recorded    Recent Labs     24  1601 24  0416 24  0430   WBC 19.2* 10.7 7.8   RBC 4.06* 3.63* 3.62*   HEMOGLOBIN 13.4 12.2 11.9*   HEMATOCRIT 39.3 33.9* 34.7*   MCV 96.8 93.4 95.9   MCH 33.0 33.6* 32.9   MCHC 34.1 36.0* 34.3   RDW 42.5 43.1 44.6   PLATELETCT 287 233 214   MPV 9.2 8.9* 9.0     Recent Labs     24  1601 24  0416 24  0430   SODIUM 137 139 140   POTASSIUM 3.3* 4.0 4.0   CHLORIDE 103 107 108   CO2 16* 20 22   GLUCOSE 151* 104* 96   BUN 20 15 10   CREATININE 0.65 0.50 0.52   CALCIUM 8.8 8.6 8.3*     Recent Labs     24  1601   APTT 26.9   INR 1.14*           Intake/Output                         24 07 - 24 0659 24 07 - 24 0659     7386-2022 4547-0233 Total 5768-2806 3986-8919 Total                 Intake    P.O.  370  -- 370  --  -- --    P.O. 370 -- 370 -- -- --    I.V.  294.3  -- 294.3  --  -- --    Volume (mL) (LR infusion) 294.3 -- 294.3 -- -- --    Total Intake 664.3 -- 664.3 -- -- --       Output    Urine  300  600 900  --  -- --    Number of Times Voided 1 x 1 x 2 x -- -- --    Urine Void (mL) 150 600 750 -- -- --    Output (mL) ([REMOVED] Urethral Catheter 16  Fr. 06/03/24 0930) 150 -- 150 -- -- --    Stool  --  -- --  --  -- --    Number of Times Stooled -- 0 x 0 x -- -- --    Total Output 300 600 900 -- -- --       Net I/O     364.3 -600 -235.7 -- -- --              Intake/Output Summary (Last 24 hours) at 6/4/2024 0939  Last data filed at 6/4/2024 0400  Gross per 24 hour   Intake 614.33 ml   Output 750 ml   Net -135.67 ml             escitalopram  10 mg DAILY    Respiratory Therapy Consult   Continuous RT    Pharmacy Consult Request  1 Each PHARMACY TO DOSE    ondansetron  4 mg Q4HRS PRN    ondansetron  4 mg Q4HRS PRN    docusate sodium  100 mg BID    senna-docusate  1 Tablet Nightly    senna-docusate  1 Tablet Q24HRS PRN    polyethylene glycol/lytes  1 Packet BID    magnesium hydroxide  30 mL DAILY AT 1800    bisacodyl  10 mg Q24HRS PRN    sodium phosphate  1 Each Once PRN    acetaminophen  1,000 mg Q6HRS    Followed by    [START ON 6/7/2024] acetaminophen  1,000 mg Q6HRS PRN    oxyCODONE immediate-release  5 mg Q3HRS PRN    Or    oxyCODONE immediate-release  10 mg Q3HRS PRN    gabapentin  100 mg Q8HRS    metaxalone  800 mg TID    bacitracin-polymyxin b   TID     XR lumbar AP and lateral 6/3/24 L1 compression/burst fracture, 20deg local kyphosis (from 19 on MRI). Coronal deformity with T12 apex, increased angle 13deg from 2deg on MRI.    Tracy Hauser is a 28 y.o. female presenting with multiple thoracic spinous process fractures, a 3-column burst fracture at L1, mild loss of height, mild retropulsion, T12-L1 right joint subluxation, KALI E, TLICS 4.     Assessment and Plan:  Hospital day #3 L1 burst/compression fracture with right T12-L1 facet subluxation and instability  POD #n/a  Prophylactic anticoagulation: no Start date/time: POD2.     Brain Compression: No    - Plan for OR today for T12-L2 posterior instrumented fusion with use of O-arm.  The indications, benefits, alternatives and risks to the procedure were discussed with the patient, which included but were  not limited to bleeding, infection, stroke, injury to nearby nerves and vessels, cerebrospinal fluid leak, new temporary or permanent motor weakness, hardware failure, adjacent segment disease, urinary or bowel difficulties, coma and rarely, even death.  The patient was in agreement and wished to proceed.    Please call with any questions    Angely Solomon M.D.    My total time spent caring for the patient on the day of the encounter was 35 minutes.   This does not include time spent on separately billable procedures/tests.

## 2024-06-04 NOTE — ANESTHESIA TIME REPORT
Anesthesia Start and Stop Event Times       Date Time Event    6/4/2024 1314 Ready for Procedure     1330 Anesthesia Start     1558 Anesthesia Stop          Responsible Staff  06/04/24      Name Role Begin End    Jone Lilly III, M.D. Anesth 1330 1554          Overtime Reason:  no overtime (within assigned shift)    Comments:

## 2024-06-04 NOTE — ANESTHESIA POSTPROCEDURE EVALUATION
Patient: Tracy Hauser    Procedure Summary       Date: 06/04/24 Room / Location: [facility] 06 / SURGERY [facility]    Anesthesia Start: 1330 Anesthesia Stop: 1558    Procedure: FUSION, SPINE, THORACIC, POSTERIOR APPROACH, WITH O-ARM IMAGING GUIDANCE, T12-L2 (Spine Thoracic) Diagnosis: (L1 burst/compression fracture with right T12-L1 facet subluxation and instability)    Surgeons: Angely Solomon M.D. Responsible Provider: Jone Lilly III, M.D.    Anesthesia Type: general ASA Status: 1 - Emergent            Final Anesthesia Type: general  Last vitals  BP   Blood Pressure: 110/65    Temp   36.5 °C (97.7 °F)    Pulse   90   Resp   18    SpO2   98 %      Anesthesia Post Evaluation    Patient location during evaluation: PACU  Patient participation: complete - patient participated  Level of consciousness: awake and alert  Pain score: 0    Airway patency: patent  Anesthetic complications: no  Cardiovascular status: hemodynamically stable  Respiratory status: acceptable  Hydration status: euvolemic    PONV: none          No notable events documented.     Nurse Pain Score: 0 (NPRS)

## 2024-06-04 NOTE — PROGRESS NOTES
Report previously given to PreOp RN. Pt transferred to PreOp via Bed. Transporter and Family as escort.

## 2024-06-04 NOTE — PROGRESS NOTES
Trauma / Surgical Daily Progress Note    Date of Service  6/4/2024    Chief Complaint  28 y.o. female admitted 6/2/2024 with 3 column L1 fracture and multiple thoracic spine fractures status post fall while rock climbing.    Interval Events    No critical events overnight.  No focal neurological deficits.   Pharmacological DVT prophylaxis, yes.  Antibiotic therapy, no.     - PT/OT  - NPO.  Potential operative repair.   - Transfer to [facility] pending neurosurgerical clearance.     Review of Systems  Review of Systems   Constitutional:  Positive for malaise/fatigue.   HENT: Negative.     Eyes: Negative.    Respiratory: Negative.     Cardiovascular: Negative.    Gastrointestinal: Negative.    Musculoskeletal:  Positive for back pain and myalgias. Negative for joint pain and neck pain.   Neurological: Negative.    Psychiatric/Behavioral: Negative.          Vital Signs  Temp:  [36.4 °C (97.6 °F)-36.7 °C (98 °F)] 36.4 °C (97.6 °F)  Pulse:  [55-85] 62  Resp:  [9-44] 25  BP: ()/(50-95) 104/56  SpO2:  [91 %-98 %] 92 %    Physical Exam  Physical Exam  Vitals and nursing note reviewed.   Constitutional:       General: She is awake. She is not in acute distress.     Appearance: Normal appearance. She is well-developed and normal weight. She is not ill-appearing or toxic-appearing.   HENT:      Head: Normocephalic.      Right Ear: External ear normal.      Left Ear: External ear normal.      Nose: Nose normal. No congestion.   Cardiovascular:      Rate and Rhythm: Normal rate and regular rhythm.      Pulses: Normal pulses.   Pulmonary:      Effort: No respiratory distress.   Chest:      Chest wall: No tenderness.   Abdominal:      General: There is no distension.      Tenderness: There is no abdominal tenderness. There is no guarding or rebound.   Musculoskeletal:      Cervical back: Neck supple.      Comments: Moves all extremities    Skin:     General: Skin is warm and dry.      Capillary Refill: Capillary refill takes  less than 2 seconds.      Comments: Right lower extremity laceration.    Neurological:      General: No focal deficit present.      Mental Status: She is alert.      GCS: GCS eye subscore is 4. GCS verbal subscore is 5. GCS motor subscore is 6.   Psychiatric:         Mood and Affect: Mood normal.         Behavior: Behavior normal.         Thought Content: Thought content normal.         Judgment: Judgment normal.         Laboratory  Recent Results (from the past 24 hour(s))   CBC with Differential: Tomorrow AM    Collection Time: 06/04/24  4:30 AM   Result Value Ref Range    WBC 7.8 4.8 - 10.8 K/uL    RBC 3.62 (L) 4.20 - 5.40 M/uL    Hemoglobin 11.9 (L) 12.0 - 16.0 g/dL    Hematocrit 34.7 (L) 37.0 - 47.0 %    MCV 95.9 81.4 - 97.8 fL    MCH 32.9 27.0 - 33.0 pg    MCHC 34.3 32.2 - 35.5 g/dL    RDW 44.6 35.9 - 50.0 fL    Platelet Count 214 164 - 446 K/uL    MPV 9.0 9.0 - 12.9 fL    Neutrophils-Polys 70.30 44.00 - 72.00 %    Lymphocytes 21.30 (L) 22.00 - 41.00 %    Monocytes 6.40 0.00 - 13.40 %    Eosinophils 1.30 0.00 - 6.90 %    Basophils 0.40 0.00 - 1.80 %    Immature Granulocytes 0.30 0.00 - 0.90 %    Nucleated RBC 0.00 0.00 - 0.20 /100 WBC    Neutrophils (Absolute) 5.47 1.82 - 7.42 K/uL    Lymphs (Absolute) 1.66 1.00 - 4.80 K/uL    Monos (Absolute) 0.50 0.00 - 0.85 K/uL    Eos (Absolute) 0.10 0.00 - 0.51 K/uL    Baso (Absolute) 0.03 0.00 - 0.12 K/uL    Immature Granulocytes (abs) 0.02 0.00 - 0.11 K/uL    NRBC (Absolute) 0.00 K/uL   Comp Metabolic Panel (CMP): Tomorrow AM    Collection Time: 06/04/24  4:30 AM   Result Value Ref Range    Sodium 140 135 - 145 mmol/L    Potassium 4.0 3.6 - 5.5 mmol/L    Chloride 108 96 - 112 mmol/L    Co2 22 20 - 33 mmol/L    Anion Gap 10.0 7.0 - 16.0    Glucose 96 65 - 99 mg/dL    Bun 10 8 - 22 mg/dL    Creatinine 0.52 0.50 - 1.40 mg/dL    Calcium 8.3 (L) 8.5 - 10.5 mg/dL    Correct Calcium 8.6 8.5 - 10.5 mg/dL    AST(SGOT) 28 12 - 45 U/L    ALT(SGPT) 13 2 - 50 U/L    Alkaline  Phosphatase 35 30 - 99 U/L    Total Bilirubin 0.5 0.1 - 1.5 mg/dL    Albumin 3.6 3.2 - 4.9 g/dL    Total Protein 6.0 6.0 - 8.2 g/dL    Globulin 2.4 1.9 - 3.5 g/dL    A-G Ratio 1.5 g/dL   ESTIMATED GFR    Collection Time: 06/04/24  4:30 AM   Result Value Ref Range    GFR (CKD-EPI) 130 >60 mL/min/1.73 m 2       Fluids    Intake/Output Summary (Last 24 hours) at 6/4/2024 0820  Last data filed at 6/4/2024 0400  Gross per 24 hour   Intake 614.33 ml   Output 900 ml   Net -285.67 ml       Core Measures & Quality Metrics  Labs reviewed, Medications reviewed and Radiology images reviewed  Castro catheter: No Castro      DVT Prophylaxis: Enoxaparin (Lovenox)  DVT prophylaxis - mechanical: SCDs  Ulcer prophylaxis: Not indicated    Assessed for rehab: Patient was assess for and/or received rehabilitation services during this hospitalization    RAP Score Total: 2    CAGE Results: negative Blood Alcohol>0.08: no       Assessment/Plan  * Trauma- (present on admission)  Assessment & Plan  Rock climbing, 20 ft fall.  Trauma Yellow Activation.  Carito Colorado MD. Trauma Surgery.    Lumbar compression fracture, closed, initial encounter ([facility])- (present on admission)  Assessment & Plan  Comminuted 3 column L1 fracture with burst type fracture of the vertebral body with approximately 25% loss of height, mild bony retropulsion. Bilateral facet and left lamina fracture and mild subluxation of the right T12-L1 facet joint.   MRI L1 burst fracture with involvement of posterior elements and extension of the fracture line into the posterior cortex of the vertebral body with mild posterior cortical tilting and slight impingement upon the spinal canal but no cauda equina or conus compression.Thin dorsal epidural hematoma between T10 and L3. No significant mass effect   6/3 Upright films with L1 compression burst fracture with anterior wedging and right lateral wedging resulting in localized curvature convex left.   6/4 Potential operative  repair..   TLSO when out of bed.  No lifting greater than 10 lbs  Angely Solomon MD. Neurosurgeon. [facility].    Discharge planning issues- (present on admission)  Assessment & Plan  Lives in [place].    Fracture of transverse process of thoracic vertebra, closed, initial encounter ([facility])- (present on admission)  Assessment & Plan  Transverse process fractures T1-T8.    Contraindication to deep vein thrombosis (DVT) prophylaxis- (present on admission)  Assessment & Plan  VTE prophylaxis initially contraindicated secondary to elevated bleeding risk.  6/3 Prophylactic dose enoxaparin 30 mg BID initiated.   VTE Prophylaxis approved by Neurosurgery.    Abrasion- (present on admission)  Assessment & Plan  Multiple abrasions.  Wound care.    Avulsion of leg, right, initial encounter- (present on admission)  Assessment & Plan  Avulsion.  Wound care.  6/4 Staple closure.       Discussed patient condition with Patient and trauma surgery, Dr. Carito Colorado.

## 2024-06-05 ENCOUNTER — APPOINTMENT (OUTPATIENT)
Dept: RADIOLOGY | Facility: MEDICAL CENTER | Age: 29
DRG: 460 | End: 2024-06-05
Attending: SURGERY
Payer: COMMERCIAL

## 2024-06-05 LAB
ALBUMIN SERPL BCP-MCNC: 3.4 G/DL (ref 3.2–4.9)
ALBUMIN/GLOB SERPL: 1.8 G/DL
ALP SERPL-CCNC: 34 U/L (ref 30–99)
ALT SERPL-CCNC: 21 U/L (ref 2–50)
ANION GAP SERPL CALC-SCNC: 11 MMOL/L (ref 7–16)
AST SERPL-CCNC: 44 U/L (ref 12–45)
BASOPHILS # BLD AUTO: 0.1 % (ref 0–1.8)
BASOPHILS # BLD: 0.01 K/UL (ref 0–0.12)
BILIRUB SERPL-MCNC: 0.4 MG/DL (ref 0.1–1.5)
BUN SERPL-MCNC: 16 MG/DL (ref 8–22)
CALCIUM ALBUM COR SERPL-MCNC: 8.5 MG/DL (ref 8.5–10.5)
CALCIUM SERPL-MCNC: 8 MG/DL (ref 8.5–10.5)
CHLORIDE SERPL-SCNC: 103 MMOL/L (ref 96–112)
CO2 SERPL-SCNC: 22 MMOL/L (ref 20–33)
CREAT SERPL-MCNC: 0.46 MG/DL (ref 0.5–1.4)
EOSINOPHIL # BLD AUTO: 0.01 K/UL (ref 0–0.51)
EOSINOPHIL NFR BLD: 0.1 % (ref 0–6.9)
ERYTHROCYTE [DISTWIDTH] IN BLOOD BY AUTOMATED COUNT: 43.9 FL (ref 35.9–50)
GFR SERPLBLD CREATININE-BSD FMLA CKD-EPI: 134 ML/MIN/1.73 M 2
GLOBULIN SER CALC-MCNC: 1.9 G/DL (ref 1.9–3.5)
GLUCOSE SERPL-MCNC: 87 MG/DL (ref 65–99)
HCT VFR BLD AUTO: 29.6 % (ref 37–47)
HGB BLD-MCNC: 10.3 G/DL (ref 12–16)
IMM GRANULOCYTES # BLD AUTO: 0.06 K/UL (ref 0–0.11)
IMM GRANULOCYTES NFR BLD AUTO: 0.6 % (ref 0–0.9)
LYMPHOCYTES # BLD AUTO: 1.56 K/UL (ref 1–4.8)
LYMPHOCYTES NFR BLD: 16.6 % (ref 22–41)
MCH RBC QN AUTO: 33.7 PG (ref 27–33)
MCHC RBC AUTO-ENTMCNC: 34.8 G/DL (ref 32.2–35.5)
MCV RBC AUTO: 96.7 FL (ref 81.4–97.8)
MONOCYTES # BLD AUTO: 0.6 K/UL (ref 0–0.85)
MONOCYTES NFR BLD AUTO: 6.4 % (ref 0–13.4)
NEUTROPHILS # BLD AUTO: 7.14 K/UL (ref 1.82–7.42)
NEUTROPHILS NFR BLD: 76.2 % (ref 44–72)
NRBC # BLD AUTO: 0 K/UL
NRBC BLD-RTO: 0 /100 WBC (ref 0–0.2)
PLATELET # BLD AUTO: 185 K/UL (ref 164–446)
PMV BLD AUTO: 9.3 FL (ref 9–12.9)
POTASSIUM SERPL-SCNC: 3.9 MMOL/L (ref 3.6–5.5)
PROT SERPL-MCNC: 5.3 G/DL (ref 6–8.2)
RBC # BLD AUTO: 3.06 M/UL (ref 4.2–5.4)
SODIUM SERPL-SCNC: 136 MMOL/L (ref 135–145)
WBC # BLD AUTO: 9.4 K/UL (ref 4.8–10.8)

## 2024-06-05 PROCEDURE — 97116 GAIT TRAINING THERAPY: CPT

## 2024-06-05 PROCEDURE — 51798 US URINE CAPACITY MEASURE: CPT

## 2024-06-05 PROCEDURE — 700102 HCHG RX REV CODE 250 W/ 637 OVERRIDE(OP): Performed by: SURGERY

## 2024-06-05 PROCEDURE — 80053 COMPREHEN METABOLIC PANEL: CPT

## 2024-06-05 PROCEDURE — 99232 SBSQ HOSP IP/OBS MODERATE 35: CPT

## 2024-06-05 PROCEDURE — 93970 EXTREMITY STUDY: CPT

## 2024-06-05 PROCEDURE — 97535 SELF CARE MNGMENT TRAINING: CPT

## 2024-06-05 PROCEDURE — 700102 HCHG RX REV CODE 250 W/ 637 OVERRIDE(OP)

## 2024-06-05 PROCEDURE — 770001 HCHG ROOM/CARE - MED/SURG/GYN PRIV*

## 2024-06-05 PROCEDURE — 36415 COLL VENOUS BLD VENIPUNCTURE: CPT

## 2024-06-05 PROCEDURE — A9270 NON-COVERED ITEM OR SERVICE: HCPCS

## 2024-06-05 PROCEDURE — A9270 NON-COVERED ITEM OR SERVICE: HCPCS | Performed by: SURGERY

## 2024-06-05 PROCEDURE — 85025 COMPLETE CBC W/AUTO DIFF WBC: CPT

## 2024-06-05 PROCEDURE — 97530 THERAPEUTIC ACTIVITIES: CPT

## 2024-06-05 RX ORDER — ETHYL ALCOHOL 62 %
1 SWAB, MEDICATED TOPICAL 2 TIMES DAILY
Status: DISCONTINUED | OUTPATIENT
Start: 2024-06-05 | End: 2024-06-06

## 2024-06-05 RX ADMIN — MAGNESIUM HYDROXIDE 30 ML: 1200 LIQUID ORAL at 17:08

## 2024-06-05 RX ADMIN — ACETAMINOPHEN 1000 MG: 500 TABLET, FILM COATED ORAL at 23:05

## 2024-06-05 RX ADMIN — GABAPENTIN 100 MG: 100 CAPSULE ORAL at 21:14

## 2024-06-05 RX ADMIN — ACETAMINOPHEN 1000 MG: 500 TABLET, FILM COATED ORAL at 17:07

## 2024-06-05 RX ADMIN — SENNOSIDES AND DOCUSATE SODIUM 1 TABLET: 50; 8.6 TABLET ORAL at 21:14

## 2024-06-05 RX ADMIN — Medication 1 APPLICATOR: at 05:16

## 2024-06-05 RX ADMIN — DOCUSATE SODIUM 100 MG: 100 CAPSULE, LIQUID FILLED ORAL at 17:08

## 2024-06-05 RX ADMIN — DEXTROMETHORPHAN HYDROBROMIDE, GUAIFENESIN, PHENYLEPHRINE HYDROCHLORIDE: 20; 200; 10 SOLUTION ORAL at 11:18

## 2024-06-05 RX ADMIN — OXYCODONE HYDROCHLORIDE 5 MG: 5 TABLET ORAL at 03:04

## 2024-06-05 RX ADMIN — ESCITALOPRAM OXALATE 10 MG: 10 TABLET ORAL at 05:15

## 2024-06-05 RX ADMIN — TIZANIDINE 4 MG: 4 TABLET ORAL at 17:07

## 2024-06-05 RX ADMIN — DEXTROMETHORPHAN HYDROBROMIDE, GUAIFENESIN, PHENYLEPHRINE HYDROCHLORIDE: 20; 200; 10 SOLUTION ORAL at 17:08

## 2024-06-05 RX ADMIN — ACETAMINOPHEN 1000 MG: 500 TABLET, FILM COATED ORAL at 05:15

## 2024-06-05 RX ADMIN — TIZANIDINE 4 MG: 4 TABLET ORAL at 05:15

## 2024-06-05 RX ADMIN — OXYCODONE HYDROCHLORIDE 5 MG: 5 TABLET ORAL at 21:14

## 2024-06-05 RX ADMIN — ACETAMINOPHEN 1000 MG: 500 TABLET, FILM COATED ORAL at 11:18

## 2024-06-05 RX ADMIN — GABAPENTIN 100 MG: 100 CAPSULE ORAL at 05:16

## 2024-06-05 RX ADMIN — POLYETHYLENE GLYCOL 3350 1 PACKET: 17 POWDER, FOR SOLUTION ORAL at 05:16

## 2024-06-05 RX ADMIN — POLYETHYLENE GLYCOL 3350 1 PACKET: 17 POWDER, FOR SOLUTION ORAL at 17:07

## 2024-06-05 RX ADMIN — TIZANIDINE 4 MG: 4 TABLET ORAL at 23:05

## 2024-06-05 RX ADMIN — GABAPENTIN 100 MG: 100 CAPSULE ORAL at 13:22

## 2024-06-05 RX ADMIN — DEXTROMETHORPHAN HYDROBROMIDE, GUAIFENESIN, PHENYLEPHRINE HYDROCHLORIDE: 20; 200; 10 SOLUTION ORAL at 05:16

## 2024-06-05 RX ADMIN — TIZANIDINE 4 MG: 4 TABLET ORAL at 11:18

## 2024-06-05 RX ADMIN — DOCUSATE SODIUM 100 MG: 100 CAPSULE, LIQUID FILLED ORAL at 05:16

## 2024-06-05 RX ADMIN — Medication 1 APPLICATOR: at 17:07

## 2024-06-05 RX ADMIN — OXYCODONE HYDROCHLORIDE 10 MG: 10 TABLET ORAL at 17:36

## 2024-06-05 ASSESSMENT — COGNITIVE AND FUNCTIONAL STATUS - GENERAL
SUGGESTED CMS G CODE MODIFIER DAILY ACTIVITY: CK
CLIMB 3 TO 5 STEPS WITH RAILING: A LITTLE
DRESSING REGULAR LOWER BODY CLOTHING: A LOT
SUGGESTED CMS G CODE MODIFIER MOBILITY: CK
TOILETING: A LITTLE
MOVING TO AND FROM BED TO CHAIR: A LITTLE
STANDING UP FROM CHAIR USING ARMS: A LITTLE
CLIMB 3 TO 5 STEPS WITH RAILING: A LITTLE
WALKING IN HOSPITAL ROOM: A LITTLE
SUGGESTED CMS G CODE MODIFIER DAILY ACTIVITY: CK
HELP NEEDED FOR BATHING: A LITTLE
MOVING TO AND FROM BED TO CHAIR: A LITTLE
STANDING UP FROM CHAIR USING ARMS: A LITTLE
DRESSING REGULAR UPPER BODY CLOTHING: A LITTLE
TURNING FROM BACK TO SIDE WHILE IN FLAT BAD: A LITTLE
PERSONAL GROOMING: A LITTLE
MOBILITY SCORE: 20
DAILY ACTIVITIY SCORE: 19
DRESSING REGULAR LOWER BODY CLOTHING: A LITTLE
HELP NEEDED FOR BATHING: A LOT
MOBILITY SCORE: 18
WALKING IN HOSPITAL ROOM: A LITTLE
SUGGESTED CMS G CODE MODIFIER MOBILITY: CJ
MOVING FROM LYING ON BACK TO SITTING ON SIDE OF FLAT BED: A LITTLE
DAILY ACTIVITIY SCORE: 19
DRESSING REGULAR UPPER BODY CLOTHING: A LITTLE

## 2024-06-05 ASSESSMENT — GAIT ASSESSMENTS
ASSISTIVE DEVICE: FRONT WHEEL WALKER;NONE
GAIT LEVEL OF ASSIST: CONTACT GUARD ASSIST
DISTANCE (FEET): 100
DEVIATION: DECREASED BASE OF SUPPORT;BRADYKINETIC

## 2024-06-05 ASSESSMENT — ENCOUNTER SYMPTOMS
SENSORY CHANGE: 0
VOMITING: 0
DIZZINESS: 0
NAUSEA: 0
SHORTNESS OF BREATH: 0
ROS GI COMMENTS: LAST BM 6/2
ABDOMINAL PAIN: 0
MYALGIAS: 1
WEAKNESS: 0
TINGLING: 0
BACK PAIN: 1

## 2024-06-05 ASSESSMENT — PAIN DESCRIPTION - PAIN TYPE
TYPE: SURGICAL PAIN
TYPE: SURGICAL PAIN
TYPE: ACUTE PAIN
TYPE: SURGICAL PAIN
TYPE: ACUTE PAIN

## 2024-06-05 NOTE — THERAPY
Occupational Therapy  Daily Treatment     Patient Name: Tracy Hauser  Age:  28 y.o., Sex:  female  Medical Record #: [id number]  Today's Date: 6/5/2024     Precautions: Fall Risk, Spinal / Back Precautions , TLSO (Thoracolumbosacral orthosis)  Comments: TLSO donned sitting EOB    Assessment    Pt seen for OT tx. Pt is POD #1 T12-L1 fusion. Pt demo progress towards OT goals, but is currently limited by pain, decreased activity tolerance, generalized weakness, balance deficits, and decreased functional mobility. Pt demo good carryover from previous session as she was able to recall 3/3 spinal precautions w/o cues. Pt required SBA-min A to complete ADLs, functional mobility, and txfs with no AD. Pt with no LOB. Pt reported an increase in pain when attempting to complete activities involving reaching, such as pericare. Encouraged pt to complete pain free AROM to reduce risk of frozen shoulder and contractures. Will continue to follow for ongoing acute OT services.     Plan    Treatment Plan Status: Continue Current Treatment Plan  Type of Treatment: Self Care / Activities of Daily Living, Adaptive Equipment, Therapeutic Exercises, Neuro Re-Education / Balance, Therapeutic Activity  Treatment Frequency: 5 Times per Week  Treatment Duration: Until Therapy Goals Met    DC Equipment Recommendations: Unable to determine at this time  Discharge Recommendations: Recommend home health for continued occupational therapy services (with family support once able to complete ADLs without assist)     Objective      Vitals   O2 Delivery Device None - Room Air   Vitals Comments No c/o dizziness during session. Pt reports having a hx of low BP and is able to recognize the symptoms and act accordingly   Pain 0 - 10 Group   Therapist Pain Assessment Post Activity Pain Same as Prior to Activity;Nurse Notified  (Not rated, reported a mild increase in back and rib pain with activity)   Cognition    Cognition / Consciousness WDL   Level of  Consciousness Alert   Comments Very pleasant, cooperative, and receptive to edcuation/training.   Balance   Sitting Balance (Static) Fair +   Sitting Balance (Dynamic) Fair   Standing Balance (Static) Fair   Standing Balance (Dynamic) Fair   Weight Shift Sitting Fair   Weight Shift Standing Fair   Skilled Intervention Verbal Cuing   Comments No AD, no LOB   Bed Mobility    Supine to Sit Standby Assist   Sit to Supine   (Up to chair post)   Scooting Supervised   Rolling Supervised   Skilled Intervention Verbal Cuing   Comments HOB elevated, log roll   Activities of Daily Living   Eating Modified Independent   Grooming Standby Assist;Standing  (Hand washing at sink)   Upper Body Dressing Minimal Assist  (Don TLSO; cues to adjust larger  base straps to improve fit in standing and reduce tension placed on synching straps.)   Lower Body Dressing Standby Assist  (Demo ability to tailor sit to manage socks)   Toileting Standby Assist  (Able to complete pericare after voiding in standard toilet with; effortful due to an increase in BUE pain. Edu on AE/DME available (i.e., Bidet, toilet aid, disposable wipes))   Skilled Intervention Verbal Cuing;Tactile Cuing;Sequencing;Compensatory Strategies   How much help from another person does the patient currently need...   6 Clicks Daily Activity Score 19   Functional Mobility   Sit to Stand Contact Guard Assist   Bed, Chair, Wheelchair Transfer Contact Guard Assist   Toilet Transfers Contact Guard Assist   Mobility Functional mobility in room and hallway (to assess endurance for household distances), no AD, no LOB   Skilled Intervention Verbal Cuing   Patient / Family Goals   Patient / Family Goal #1 go home   Goal #1 Outcome Progressing as expected   Short Term Goals   Short Term Goal # 1 pt will demo ADL txfs w/ supv   Goal Outcome # 1 Progressing as expected   Short Term Goal # 2 pt will dress UB including TLSO w/ supv   Goal Outcome # 2 Progressing as expected   Short Term Goal  # 3 pt will dress LB w/ supv and AE prn   Goal Outcome # 3 Progressing as expected   Short Term Goal # 4 pt will demo toileting w/ supv   Goal Outcome # 4 Progressing as expected   Education Group   Education Provided Spinal Precautions;Brace Wear and Care;Role of Occupational Therapist;Activities of Daily Living   Role of Occupational Therapist Patient Response Patient;Family;Acceptance;Explanation;Verbal Demonstration   Spinal Precautions Patient Response Patient;Family;Acceptance;Explanation;Verbal Demonstration   Brace Wear & Care Patient Response Patient;Family;Acceptance;Explanation;Teach Back;Verbal Demonstration;Action Demonstration   ADL Patient Response Patient;Family;Acceptance;Explanation;Verbal Demonstration

## 2024-06-05 NOTE — PROGRESS NOTES
4 Eyes Skin Assessment Completed by Francisco RN and KIRILL Herbert.    Head WDL  Ears WDL  Nose WDL  Mouth WDL - dry lips  Neck WDL  Breast/Chest WDL  Shoulder Blades WDL  Spine Incision and Bruising - island dressing and hemovac in place - bruising  (R) Arm/Elbow/Hand WDL  (L) Arm/Elbow/Hand WDL  Abdomen WDL  Groin WDL  Scrotum/Coccyx/Buttocks WDL  (R) Leg Abrasion - staples in place  (L) Leg WDL  (R) Heel/Foot/Toe WDL  (L) Heel/Foot/Toe WDL          Devices In Places Blood Pressure Cuff, Pulse Ox, and Nasal Cannula      Interventions In Place Gray Ear Foams and Pillows    Possible Skin Injury No    Pictures Uploaded Into Epic N/A  Wound Consult Placed N/A  RN Wound Prevention Protocol Ordered No

## 2024-06-05 NOTE — PROGRESS NOTES
Trauma / Surgical Daily Progress Note    Date of Service  6/5/2024    Chief Complaint  28 y.o. female admitted 6/2/2024 with 3 column L1 fracture and multiple thoracic spine fractures status post fall while rock climbing.     POD #1 ORIF of T12/L1 fractures with posterolateral instrumentation at T12-L1, L1-L2. Posterolateral arthrodesis at T12-L1 and L1-2.    Interval Events  Transferred from ICU to Ortho, post surgery.  Adequate pain control.    - Awaiting approval by neurosurgery to initiate DVT prophylaxis - Hemovac in place.  - Reevaluation by therapy requested.  -Pulmonary hygiene.  -Mobilization.  Discussed plan of care with patient and parents.    Disposition: Patient would like to return home to [place] for rehab.  She plans to fly.  Patient needs to practice sitting in chair in preparation to fly.      Review of Systems  Review of Systems   Constitutional:  Positive for malaise/fatigue.   Respiratory:  Negative for shortness of breath.    Gastrointestinal:  Negative for abdominal pain, nausea and vomiting.        Last BM 6/2   Musculoskeletal:  Positive for back pain and myalgias.   Neurological:  Negative for dizziness, tingling, sensory change and weakness.   All other systems reviewed and are negative.       Vital Signs  Temp:  [36.2 °C (97.1 °F)-36.6 °C (97.9 °F)] 36.3 °C (97.3 °F)  Pulse:  [] 57  Resp:  [11-35] 16  BP: ()/(41-69) 95/44  SpO2:  [90 %-100 %] 99 %    Physical Exam  Physical Exam  Vitals and nursing note reviewed.   Constitutional:       General: She is awake. She is not in acute distress.     Appearance: Normal appearance. She is not ill-appearing.   HENT:      Mouth/Throat:      Mouth: Mucous membranes are moist.   Eyes:      Pupils: Pupils are equal, round, and reactive to light.   Cardiovascular:      Rate and Rhythm: Normal rate and regular rhythm.      Pulses: Normal pulses.   Pulmonary:      Effort: Pulmonary effort is normal. No respiratory distress.   Abdominal:       General: There is no distension.      Palpations: Abdomen is soft.      Tenderness: There is no abdominal tenderness.   Musculoskeletal:      Cervical back: Neck supple.      Comments: Moves all extremities      Skin:     General: Skin is warm and dry.      Capillary Refill: Capillary refill takes less than 2 seconds.      Comments: Right lower extremity wound - stapled  Hemovac   Neurological:      General: No focal deficit present.      Mental Status: She is alert.      GCS: GCS eye subscore is 4. GCS verbal subscore is 5. GCS motor subscore is 6.   Psychiatric:         Mood and Affect: Mood normal.         Judgment: Judgment normal.         Laboratory  Recent Results (from the past 24 hour(s))   CBC with Differential: Tomorrow AM    Collection Time: 06/05/24  5:55 AM   Result Value Ref Range    WBC 9.4 4.8 - 10.8 K/uL    RBC 3.06 (L) 4.20 - 5.40 M/uL    Hemoglobin 10.3 (L) 12.0 - 16.0 g/dL    Hematocrit 29.6 (L) 37.0 - 47.0 %    MCV 96.7 81.4 - 97.8 fL    MCH 33.7 (H) 27.0 - 33.0 pg    MCHC 34.8 32.2 - 35.5 g/dL    RDW 43.9 35.9 - 50.0 fL    Platelet Count 185 164 - 446 K/uL    MPV 9.3 9.0 - 12.9 fL    Neutrophils-Polys 76.20 (H) 44.00 - 72.00 %    Lymphocytes 16.60 (L) 22.00 - 41.00 %    Monocytes 6.40 0.00 - 13.40 %    Eosinophils 0.10 0.00 - 6.90 %    Basophils 0.10 0.00 - 1.80 %    Immature Granulocytes 0.60 0.00 - 0.90 %    Nucleated RBC 0.00 0.00 - 0.20 /100 WBC    Neutrophils (Absolute) 7.14 1.82 - 7.42 K/uL    Lymphs (Absolute) 1.56 1.00 - 4.80 K/uL    Monos (Absolute) 0.60 0.00 - 0.85 K/uL    Eos (Absolute) 0.01 0.00 - 0.51 K/uL    Baso (Absolute) 0.01 0.00 - 0.12 K/uL    Immature Granulocytes (abs) 0.06 0.00 - 0.11 K/uL    NRBC (Absolute) 0.00 K/uL   Comp Metabolic Panel (CMP): Tomorrow AM    Collection Time: 06/05/24  5:55 AM   Result Value Ref Range    Sodium 136 135 - 145 mmol/L    Potassium 3.9 3.6 - 5.5 mmol/L    Chloride 103 96 - 112 mmol/L    Co2 22 20 - 33 mmol/L    Anion Gap 11.0 7.0 -  16.0    Glucose 87 65 - 99 mg/dL    Bun 16 8 - 22 mg/dL    Creatinine 0.46 (L) 0.50 - 1.40 mg/dL    Calcium 8.0 (L) 8.5 - 10.5 mg/dL    Correct Calcium 8.5 8.5 - 10.5 mg/dL    AST(SGOT) 44 12 - 45 U/L    ALT(SGPT) 21 2 - 50 U/L    Alkaline Phosphatase 34 30 - 99 U/L    Total Bilirubin 0.4 0.1 - 1.5 mg/dL    Albumin 3.4 3.2 - 4.9 g/dL    Total Protein 5.3 (L) 6.0 - 8.2 g/dL    Globulin 1.9 1.9 - 3.5 g/dL    A-G Ratio 1.8 g/dL   ESTIMATED GFR    Collection Time: 06/05/24  5:55 AM   Result Value Ref Range    GFR (CKD-EPI) 134 >60 mL/min/1.73 m 2       Fluids    Intake/Output Summary (Last 24 hours) at 6/5/2024 1207  Last data filed at 6/5/2024 1206  Gross per 24 hour   Intake 1200 ml   Output 310 ml   Net 890 ml       Core Measures & Quality Metrics  Labs reviewed, Medications reviewed and Radiology images reviewed  Castro catheter: No Castro      DVT Prophylaxis: Enoxaparin (Lovenox)  DVT prophylaxis - mechanical: SCDs  Ulcer prophylaxis: Not indicated    Assessed for rehab: Patient was assess for and/or received rehabilitation services during this hospitalization    RAP Score Total: 2    CAGE Results: negative Blood Alcohol>0.08: no       Assessment/Plan  * Trauma- (present on admission)  Assessment & Plan  Rock climbing, 20 ft fall.  Trauma Yellow Activation.  Carito Colorado MD. Trauma Surgery.    Discharge planning issues- (present on admission)  Assessment & Plan  Date of admission: 6/2/2024.  6/4 Transfer orders from TICU.   Rehab referral SNF referral.  Cleared for discharge: No.  Discharge delayed: No.  Discharge date: tbd.  Lives in [place].    Lumbar compression fracture, closed, initial encounter (HCC)- (present on admission)  Assessment & Plan  Comminuted 3 column L1 fracture with burst type fracture of the vertebral body with approximately 25% loss of height, mild bony retropulsion. Bilateral facet and left lamina fracture and mild subluxation of the right T12-L1 facet joint.   MRI L1 burst fracture with  involvement of posterior elements and extension of the fracture line into the posterior cortex of the vertebral body with mild posterior cortical tilting and slight impingement upon the spinal canal but no cauda equina or conus compression.Thin dorsal epidural hematoma between T10 and L3. No significant mass effect  6/3 Upright films with L1 compression burst fracture with anterior wedging and right lateral wedging resulting in localized curvature convex left.   6/4 ORIF of T12/L1 fractures with posterolateral instrumentation at T12-L1, L1-L2. Posterolateral arthrodesis at T12-L1 and L1-2.   TLSO when out of bed.  No lifting greater than 10 lbs  Angely Solomon MD. Neurosurgeon. [facility].    Fracture of transverse process of thoracic vertebra, closed, initial encounter ([facility])- (present on admission)  Assessment & Plan  Transverse process fractures T1-T8.    Contraindication to deep vein thrombosis (DVT) prophylaxis- (present on admission)  Assessment & Plan  VTE prophylaxis initially contraindicated secondary to elevated bleeding risk.  6/4 Trauma surveillance venous duplex ultrasonography ordered.    Abrasion- (present on admission)  Assessment & Plan  Multiple abrasions.  Wound care.    Avulsion of leg, right, initial encounter- (present on admission)  Assessment & Plan  Avulsion.  Wound care.  6/4 Staple closure.         Discussed patient condition with RN, Patient, and trauma surgery, Dr.. Colorado.

## 2024-06-05 NOTE — PROGRESS NOTES
"Neurosurgery Progress Note    Subjective:  No acute events overnight  Feels back is more \"stable\" today than before surgery  Some incisional and general back pain, controlled  Has not worked with PT/OT yet but is up ambulating with TLSO on and feeling better    Exam:  A&O  PERRL  URENA with good strength   SILT    Lumbar incision c/d/I, dressing in place  Hvac output 23mL/overnight, serosanguineous      BP  Min: 84/41  Max: 122/66  Pulse  Av.6  Min: 57  Max: 104  Resp  Av.4  Min: 11  Max: 35  Temp  Av.4 °C (97.5 °F)  Min: 36.2 °C (97.1 °F)  Max: 36.6 °C (97.9 °F)  SpO2  Av.6 %  Min: 90 %  Max: 100 %    No data recorded    Recent Labs     24  0430 24  0555   WBC 10.7 7.8 9.4   RBC 3.63* 3.62* 3.06*   HEMOGLOBIN 12.2 11.9* 10.3*   HEMATOCRIT 33.9* 34.7* 29.6*   MCV 93.4 95.9 96.7   MCH 33.6* 32.9 33.7*   MCHC 36.0* 34.3 34.8   RDW 43.1 44.6 43.9   PLATELETCT 233 214 185   MPV 8.9* 9.0 9.3     Recent Labs     24  04124  0430 24  0555   SODIUM 139 140 136   POTASSIUM 4.0 4.0 3.9   CHLORIDE 107 108 103   CO2 20 22 22   GLUCOSE 104* 96 87   BUN 15 10 16   CREATININE 0.50 0.52 0.46*   CALCIUM 8.6 8.3* 8.0*     Recent Labs     24  1601   APTT 26.9   INR 1.14*           Intake/Output                         24 - 24 -  Total [id number] Total                 Intake    P.O.  25  -- 25  --  -- --    P.O. 25 -- 25 -- -- --    I.V.  1200  -- 1200  --  -- --    Volume (mL) (Lactated Ringers) 1000 -- 1000 -- -- --    Volume (mL) (electrolyte-A (Plasmalyte-A) infusion) 200 -- 200 -- -- --    Total Intake 1225 -- 1225 -- -- --       Output    Urine  --  -- --  --  -- --    Number of Times Voided -- -- -- 1 x -- 1 x    Drains  70  160 230  30  -- 30    Output (mL) (Closed/Suction Drain 1 Midline Back Hemovac) 70 160 230 30 -- 30    Blood  50  -- 50  --  -- --    Est. Blood Loss 50 -- " 50 -- -- --    Total Output 120 160 280 30 -- 30       Net I/O     1105 -160 945 -30 -- -30              Intake/Output Summary (Last 24 hours) at 6/5/2024 1256  Last data filed at 6/5/2024 1206  Gross per 24 hour   Intake 1200 ml   Output 310 ml   Net 890 ml       $ Bladder Scan Results (mL):  (no volume displayed following voiding in toilet)     Nozin nasal  swab  1 Applicator BID    morphine injection  2 mg Q3HRS PRN    tizanidine  4 mg Q6HRS    escitalopram  10 mg DAILY    Respiratory Therapy Consult   Continuous RT    Pharmacy Consult Request  1 Each PHARMACY TO DOSE    ondansetron  4 mg Q4HRS PRN    ondansetron  4 mg Q4HRS PRN    docusate sodium  100 mg BID    senna-docusate  1 Tablet Nightly    senna-docusate  1 Tablet Q24HRS PRN    polyethylene glycol/lytes  1 Packet BID    magnesium hydroxide  30 mL DAILY AT 1800    bisacodyl  10 mg Q24HRS PRN    sodium phosphate  1 Each Once PRN    acetaminophen  1,000 mg Q6HRS    Followed by    [START ON 6/7/2024] acetaminophen  1,000 mg Q6HRS PRN    oxyCODONE immediate-release  5 mg Q3HRS PRN    Or    oxyCODONE immediate-release  10 mg Q3HRS PRN    gabapentin  100 mg Q8HRS    bacitracin-polymyxin b   TID     XR lumbar AP and lateral 6/3/24 L1 compression/burst fracture, 20deg local kyphosis (from 19 on MRI). Coronal deformity with T12 apex, increased angle 13deg from 2deg on MRI.    Tracy Hauser is a 28 y.o. female presenting with multiple thoracic spinous process fractures, a 3-column burst fracture at L1, mild loss of height, mild retropulsion, T12-L1 right joint subluxation, KALI E, TLICS 4.     Assessment and Plan:  Hospital day #4 L1 burst/compression fracture with right T12-L1 facet subluxation and instability  POD #1      T12-L1 fusion  Prophylactic anticoagulation: no Start date/time: POD2.     Brain Compression: No    - Continue pain control, utilize muscle relaxers as much as possible  - OK to work with PT/OT with TLSO and OOB as much as tolerated.  -  Keep hvac, may dc tomorrow.  - Rehab pending?    Please call with any questions    Liya Izaguirre

## 2024-06-05 NOTE — THERAPY
Physical Therapy   Daily Treatment     Patient Name: Tracy Hauser  Age:  28 y.o., Sex:  female  Medical Record #: [id number]  Today's Date: 6/5/2024     Precautions  Precautions: Fall Risk;Spinal / Back Precautions ;TLSO (Thoracolumbosacral orthosis)  Comments: TLSO donned sitting EOB    Assessment    Pt seen for Physical Therapy treatment session following T12-L1 fusion. Pt able to complete supine to sit via log roll with CGA and cues for log roll. Once seated EOB, required Mod A to don TLSO at EOB for pt and family education. Initiated ambulation with FWW, pt completed 100 ft with FWW then transitioned to no AD for another 200 ft. Slow but steady gait noted. Negotiated 2 steps with light railing support and verbal cues for stair sequencing without LE buckling or LOB. Pt progressing as anticipated and encouraged pt to be OOB for ALL meals and ambulate 4-5x/day with nursing (therapy included) with distances to tolerance. Pt anticipating flying home for recovery/rehab so encouraged time OOB to build upright tolerance for flight. PT will continue to follow while in house.     Plan    Treatment Plan Status: Continue Current Treatment Plan  Type of Treatment: Bed Mobility, Equipment, Family / Caregiver Training, Gait Training, Neuro Re-Education / Balance, Self Care / Home Evaluation, Stair Training, Therapeutic Activities, Therapeutic Exercise  Treatment Frequency: 5 Times per Week  Treatment Duration: Until Therapy Goals Met    DC Equipment Recommendations:  TBD, anticipate no DME should pt continue to progress. May benefit from FWW for flights/travel time  Discharge Recommendations:  Anticiapte DC home with family support when medically cleared to fly home with sister       Objective     06/05/24 1355   Precautions   Precautions Fall Risk;Spinal / Back Precautions ;TLSO (Thoracolumbosacral orthosis)   Comments TLSO donned sitting EOB   Vitals   O2 Delivery Device None - Room Air   Vitals Comments BP low sitting and  standing, but baseline for pt   Pain 0 - 10 Group   Therapist Pain Assessment During Activity;Nurse Notified  (mild post op spine and rib pain, not rated)   Cognition    Cognition / Consciousness WDL   Level of Consciousness Alert   Comments pleasant and receptive to PT, parents at bedside and supportive/engaged in care   Active ROM Lower Body    Active ROM Lower Body  WDL   Strength Lower Body   Lower Body Strength  X   Comments slightly limited from baseline due to pain   Balance   Sitting Balance (Static) Fair +   Sitting Balance (Dynamic) Fair   Standing Balance (Static) Fair   Standing Balance (Dynamic) Fair   Weight Shift Sitting Fair   Weight Shift Standing Fair   Skilled Intervention Verbal Cuing   Comments without AD. slightly improved balance with FWW; however, pt minimally supporting self with FWW   Bed Mobility    Supine to Sit Contact Guard Assist  (HOB flat, railing used. Cues for sequencing)   Sit to Supine Contact Guard Assist  (cues for sequencing and extra time for pt to manage LEs)   Scooting Supervised  (seated)   Rolling Supervised  (with bed rail support)   Skilled Intervention Verbal Cuing   Gait Analysis   Gait Level Of Assist Contact Guard Assist   Assistive Device Front Wheel Walker;None   Distance (Feet) 100  (ft with FWW, then 200 ft with no AD)   Deviation Decreased Base Of Support;Bradykinetic  (decreased step length)   # of Stairs Climbed 2  (with railng)   Level of Assist with Stairs Contact Guard Assist  (cues for step seqencing (up with good - R, down with bad -L))   Weight Bearing Status no restrictions   Skilled Intervention Verbal Cuing;Sequencing   Comments considerable improvement with added feeling of stability post operatively. Slight dizziness throughout mobility   Functional Mobility   Sit to Stand Contact Guard Assist   Bed, Chair, Wheelchair Transfer Contact Guard Assist   Toilet Transfers Contact Guard Assist   Transfer Method Stand Step   6 Clicks Assessment - How much  HELP from from another person do you currently need... (If the patient hasn't done an activity recently, how much help from another person do you think he/she would need if he/she tried?)   Turning from your back to your side while in a flat bed without using bedrails? 3   Moving from lying on your back to sitting on the side of a flat bed without using bedrails? 3   Moving to and from a bed to a chair (including a wheelchair)? 3   Standing up from a chair using your arms (e.g., wheelchair, or bedside chair)? 3   Walking in hospital room? 3   Climbing 3-5 steps with a railing? 3   6 clicks Mobility Score 18   Short Term Goals    Short Term Goal # 1 pt will be able to complete supine<>sitting from flat bed with SPV in 6tx in order to progress to prior level   Goal Outcome # 1 Progressing as expected   Short Term Goal # 2 pt will be able to complete STS with no AD and SPV in 6tx in order to progress to prior level   Goal Outcome # 2 Progressing as expected   Short Term Goal # 3 pt will be able to ambulate 150ft with no AD and SPV in 6tx in order to improve mobility   Goal Outcome # 3 Progressing as expected   Short Term Goal # 4 Pt will tolerate sitting up in bedside chair > 2 hours in preparation for flight home in 6 visits   Physical Therapy Treatment Plan   Physical Therapy Treatment Plan Continue Current Treatment Plan   Anticipated Discharge Equipment and Recommendations   DC Equipment Recommendations   (TBD, anticipate no DME should pt continue to progress. May benefit from FWW for flights/travel time)   Discharge Recommendations   (Anticiapte DC home with family support when medically cleared to fly home with sister)

## 2024-06-05 NOTE — PROGRESS NOTES
Virtual Nurse rounding complete.    Round Needs: Patient wanting to know when PT will work with her. Reached out to PT. No other needs at this time.

## 2024-06-05 NOTE — OR NURSING
PACU note- Patient arrived from the OR, breathing easily, alert and oriented. Surgical site assessed to midline back, dressing C/D/I, hemovac in place and compressed. Able to move all extremities, strong radial and pedal pulses, good capillary refill, denies numbness and tingling. Patient denies pain. Tolerating PO fluids without nausea. Father, Milly called and updated. Report called to the floor.

## 2024-06-05 NOTE — PROGRESS NOTES
Pt returned to T913/01. Connected to monitor and wall O2.       HR: 56  BP: 108/65  RR: 8  SpO2: 100% on 2L NC  Temp: 97.9°F Temporal        4 Eyes Skin Assessment Completed by KIRILL Lozano and KIRILL Pierre.    Head WDL  Ears Redness  Nose Redness  Mouth Redness  Neck Redness  Breast/Chest Redness  Shoulder Blades Redness  Spine Redness and Non-Blanching  (R) Arm/Elbow/Hand Bruising and Abrasion  (L) Arm/Elbow/Hand Bruising  Abdomen WDL  Groin WDL  Scrotum/Coccyx/Buttocks WDL  (R) Leg Bruising, Laceration staples  (L) Leg Bruising and Abrasion  (R) Heel/Foot/Toe WDL  (L) Heel/Foot/Toe WDL          Devices In Places Pulse Ox, SCD's, Blood Pressure Cuff, and PIV x2      Interventions In Place Sacral Mepilex, Pressure Redistribution Mattress, and Silicone NC W/Ear Foams    Possible Skin Injury Yes    Pictures Uploaded Into Epic Yes  Wound Consult Placed Yes  RN Wound Prevention Protocol Ordered No

## 2024-06-05 NOTE — DISCHARGE PLANNING
Case Management Discharge Planning    Admission Date: 6/2/2024  GMLOS: 2.8  ALOS: 3    6-Clicks ADL Score: 19  6-Clicks Mobility Score: 20      Anticipated Discharge Dispo: Discharge Disposition: Discharged to home/self care (01)      Action(s) Taken: RNCM participated in IDT rounds. Patient is s/p back surgery after fall. Per report, patient travels around in her van currently. It is not likely patient will have any needs upon DC.    Escalations Completed: None    Medically Clear: No    Next Steps: Patient is pending drain removal. OT is recommending HH however patient is uninsured. RNCM will continue to follow to assist with any discharge planning needs.     Barriers to Discharge: Medical clearance    Is the patient up for discharge tomorrow: Yes

## 2024-06-06 PROBLEM — Z78.9 NO CONTRAINDICATION TO DEEP VEIN THROMBOSIS (DVT) PROPHYLAXIS: Status: ACTIVE | Noted: 2024-06-02

## 2024-06-06 LAB
ALBUMIN SERPL BCP-MCNC: 3.2 G/DL (ref 3.2–4.9)
ALBUMIN/GLOB SERPL: 1.5 G/DL
ALP SERPL-CCNC: 55 U/L (ref 30–99)
ALT SERPL-CCNC: 41 U/L (ref 2–50)
ANION GAP SERPL CALC-SCNC: 8 MMOL/L (ref 7–16)
AST SERPL-CCNC: 81 U/L (ref 12–45)
BASOPHILS # BLD AUTO: 0.3 % (ref 0–1.8)
BASOPHILS # BLD: 0.02 K/UL (ref 0–0.12)
BILIRUB SERPL-MCNC: 0.4 MG/DL (ref 0.1–1.5)
BUN SERPL-MCNC: 11 MG/DL (ref 8–22)
CALCIUM ALBUM COR SERPL-MCNC: 8.5 MG/DL (ref 8.5–10.5)
CALCIUM SERPL-MCNC: 7.9 MG/DL (ref 8.5–10.5)
CHLORIDE SERPL-SCNC: 104 MMOL/L (ref 96–112)
CO2 SERPL-SCNC: 26 MMOL/L (ref 20–33)
CREAT SERPL-MCNC: 0.51 MG/DL (ref 0.5–1.4)
EOSINOPHIL # BLD AUTO: 0.02 K/UL (ref 0–0.51)
EOSINOPHIL NFR BLD: 0.3 % (ref 0–6.9)
ERYTHROCYTE [DISTWIDTH] IN BLOOD BY AUTOMATED COUNT: 42.9 FL (ref 35.9–50)
GFR SERPLBLD CREATININE-BSD FMLA CKD-EPI: 131 ML/MIN/1.73 M 2
GLOBULIN SER CALC-MCNC: 2.2 G/DL (ref 1.9–3.5)
GLUCOSE SERPL-MCNC: 160 MG/DL (ref 65–99)
HCT VFR BLD AUTO: 27.5 % (ref 37–47)
HGB BLD-MCNC: 9.7 G/DL (ref 12–16)
IMM GRANULOCYTES # BLD AUTO: 0.02 K/UL (ref 0–0.11)
IMM GRANULOCYTES NFR BLD AUTO: 0.3 % (ref 0–0.9)
LYMPHOCYTES # BLD AUTO: 1.11 K/UL (ref 1–4.8)
LYMPHOCYTES NFR BLD: 17.2 % (ref 22–41)
MCH RBC QN AUTO: 33.6 PG (ref 27–33)
MCHC RBC AUTO-ENTMCNC: 35.3 G/DL (ref 32.2–35.5)
MCV RBC AUTO: 95.2 FL (ref 81.4–97.8)
MONOCYTES # BLD AUTO: 0.45 K/UL (ref 0–0.85)
MONOCYTES NFR BLD AUTO: 7 % (ref 0–13.4)
NEUTROPHILS # BLD AUTO: 4.83 K/UL (ref 1.82–7.42)
NEUTROPHILS NFR BLD: 74.9 % (ref 44–72)
NRBC # BLD AUTO: 0 K/UL
NRBC BLD-RTO: 0 /100 WBC (ref 0–0.2)
PLATELET # BLD AUTO: 167 K/UL (ref 164–446)
PMV BLD AUTO: 9.4 FL (ref 9–12.9)
POTASSIUM SERPL-SCNC: 4.1 MMOL/L (ref 3.6–5.5)
PROT SERPL-MCNC: 5.4 G/DL (ref 6–8.2)
RBC # BLD AUTO: 2.89 M/UL (ref 4.2–5.4)
SODIUM SERPL-SCNC: 138 MMOL/L (ref 135–145)
WBC # BLD AUTO: 6.5 K/UL (ref 4.8–10.8)

## 2024-06-06 PROCEDURE — 770001 HCHG ROOM/CARE - MED/SURG/GYN PRIV*

## 2024-06-06 PROCEDURE — 97530 THERAPEUTIC ACTIVITIES: CPT

## 2024-06-06 PROCEDURE — A9270 NON-COVERED ITEM OR SERVICE: HCPCS

## 2024-06-06 PROCEDURE — 99232 SBSQ HOSP IP/OBS MODERATE 35: CPT

## 2024-06-06 PROCEDURE — A9270 NON-COVERED ITEM OR SERVICE: HCPCS | Performed by: SURGERY

## 2024-06-06 PROCEDURE — 700102 HCHG RX REV CODE 250 W/ 637 OVERRIDE(OP): Performed by: SURGERY

## 2024-06-06 PROCEDURE — 700102 HCHG RX REV CODE 250 W/ 637 OVERRIDE(OP)

## 2024-06-06 PROCEDURE — RXMED WILLOW AMBULATORY MEDICATION CHARGE

## 2024-06-06 PROCEDURE — 80053 COMPREHEN METABOLIC PANEL: CPT

## 2024-06-06 PROCEDURE — 85025 COMPLETE CBC W/AUTO DIFF WBC: CPT

## 2024-06-06 RX ORDER — OXYCODONE HYDROCHLORIDE 5 MG/1
5 TABLET ORAL EVERY 4 HOURS PRN
Qty: 28 TABLET | Refills: 0 | Status: SHIPPED | OUTPATIENT
Start: 2024-06-06 | End: 2024-06-14

## 2024-06-06 RX ORDER — ENOXAPARIN SODIUM 100 MG/ML
30 INJECTION SUBCUTANEOUS EVERY 12 HOURS
Status: DISCONTINUED | OUTPATIENT
Start: 2024-06-07 | End: 2024-06-07 | Stop reason: HOSPADM

## 2024-06-06 RX ORDER — AMOXICILLIN 250 MG
1 CAPSULE ORAL DAILY
COMMUNITY
Start: 2024-06-06

## 2024-06-06 RX ORDER — ENOXAPARIN SODIUM 100 MG/ML
30 INJECTION SUBCUTANEOUS EVERY 12 HOURS
Status: DISCONTINUED | OUTPATIENT
Start: 2024-06-06 | End: 2024-06-06

## 2024-06-06 RX ORDER — ACETAMINOPHEN 500 MG
500-1000 TABLET ORAL EVERY 6 HOURS PRN
COMMUNITY
Start: 2024-06-06

## 2024-06-06 RX ADMIN — GABAPENTIN 100 MG: 100 CAPSULE ORAL at 22:08

## 2024-06-06 RX ADMIN — GABAPENTIN 100 MG: 100 CAPSULE ORAL at 05:05

## 2024-06-06 RX ADMIN — DOCUSATE SODIUM 100 MG: 100 CAPSULE, LIQUID FILLED ORAL at 16:34

## 2024-06-06 RX ADMIN — ESCITALOPRAM OXALATE 10 MG: 10 TABLET ORAL at 05:05

## 2024-06-06 RX ADMIN — TIZANIDINE 4 MG: 4 TABLET ORAL at 11:55

## 2024-06-06 RX ADMIN — OXYCODONE HYDROCHLORIDE 10 MG: 10 TABLET ORAL at 13:51

## 2024-06-06 RX ADMIN — TIZANIDINE 4 MG: 4 TABLET ORAL at 18:07

## 2024-06-06 RX ADMIN — DOCUSATE SODIUM 100 MG: 100 CAPSULE, LIQUID FILLED ORAL at 05:05

## 2024-06-06 RX ADMIN — POLYETHYLENE GLYCOL 3350 1 PACKET: 17 POWDER, FOR SOLUTION ORAL at 05:05

## 2024-06-06 RX ADMIN — ACETAMINOPHEN 1000 MG: 500 TABLET, FILM COATED ORAL at 22:08

## 2024-06-06 RX ADMIN — SENNOSIDES AND DOCUSATE SODIUM 1 TABLET: 50; 8.6 TABLET ORAL at 20:23

## 2024-06-06 RX ADMIN — ACETAMINOPHEN 1000 MG: 500 TABLET, FILM COATED ORAL at 11:55

## 2024-06-06 RX ADMIN — OXYCODONE HYDROCHLORIDE 10 MG: 10 TABLET ORAL at 20:24

## 2024-06-06 RX ADMIN — OXYCODONE HYDROCHLORIDE 10 MG: 10 TABLET ORAL at 23:27

## 2024-06-06 RX ADMIN — OXYCODONE HYDROCHLORIDE 5 MG: 5 TABLET ORAL at 17:00

## 2024-06-06 RX ADMIN — Medication 1 APPLICATOR: at 05:05

## 2024-06-06 RX ADMIN — ACETAMINOPHEN 1000 MG: 500 TABLET, FILM COATED ORAL at 05:05

## 2024-06-06 RX ADMIN — TIZANIDINE 4 MG: 4 TABLET ORAL at 05:05

## 2024-06-06 RX ADMIN — DEXTROMETHORPHAN HYDROBROMIDE, GUAIFENESIN, PHENYLEPHRINE HYDROCHLORIDE: 20; 200; 10 SOLUTION ORAL at 11:56

## 2024-06-06 RX ADMIN — ACETAMINOPHEN 1000 MG: 500 TABLET, FILM COATED ORAL at 16:34

## 2024-06-06 RX ADMIN — GABAPENTIN 100 MG: 100 CAPSULE ORAL at 14:38

## 2024-06-06 RX ADMIN — DEXTROMETHORPHAN HYDROBROMIDE, GUAIFENESIN, PHENYLEPHRINE HYDROCHLORIDE: 20; 200; 10 SOLUTION ORAL at 05:05

## 2024-06-06 RX ADMIN — DEXTROMETHORPHAN HYDROBROMIDE, GUAIFENESIN, PHENYLEPHRINE HYDROCHLORIDE: 20; 200; 10 SOLUTION ORAL at 16:34

## 2024-06-06 ASSESSMENT — ENCOUNTER SYMPTOMS
SENSORY CHANGE: 0
ROS GI COMMENTS: LAST BM 6/6
WEAKNESS: 0
NAUSEA: 0
BACK PAIN: 1
SHORTNESS OF BREATH: 0
ABDOMINAL PAIN: 0
MYALGIAS: 1
TINGLING: 0
DIZZINESS: 0
VOMITING: 0

## 2024-06-06 ASSESSMENT — PAIN DESCRIPTION - PAIN TYPE
TYPE: SURGICAL PAIN

## 2024-06-06 ASSESSMENT — GAIT ASSESSMENTS
DISTANCE (FEET): 250
DEVIATION: BRADYKINETIC
GAIT LEVEL OF ASSIST: SUPERVISED

## 2024-06-06 ASSESSMENT — COGNITIVE AND FUNCTIONAL STATUS - GENERAL
SUGGESTED CMS G CODE MODIFIER MOBILITY: CJ
MOBILITY SCORE: 22
CLIMB 3 TO 5 STEPS WITH RAILING: A LITTLE
MOVING FROM LYING ON BACK TO SITTING ON SIDE OF FLAT BED: A LITTLE

## 2024-06-06 NOTE — WOUND TEAM
[facility]  Inpatient Services  Wound and Skin Care Brief Evaluation    Admission Date: 6/2/2024     Last order of IP CONSULT TO WOUND CARE was found on 6/4/2024 from Hospital Encounter on 5/29/2024     HPI, PMH, SH: Reviewed    Chief Complaint   Patient presents with    Trauma Yellow     Diagnosis: Trauma [T14.90XA]    Unit where seen by Wound Team: T316/02     Wound consult placed regarding back. Chart and images reviewed. This discussed with bedside RN. This clinician in to assess patient. Patient pleasant and agreeable. The patient had small indentation from hemovac drain on 6/5. Bedside nursing placed offloading dressing. Today, indentation has resolved. Patient with small abrasion on back left open to air. Non-selectively debrided with N/A.     No pressure injuries or advanced wound care needs identified. Wound consult completed. No further follow up unless indicated and consulted.       Back  Sacrum          L heel  R heel          PREVENTATIVE INTERVENTIONS:    Q shift Jace - performed per nursing policy  Q shift pressure point assessments - performed per nursing policy    Surface/Positioning  Standard/trauma mattress - Currently in Place    Offloading/Redistribution  Float Heels off Bed with Pillows - Currently in Place           Mobilization      Ambulate

## 2024-06-06 NOTE — PROGRESS NOTES
Trauma / Surgical Daily Progress Note    Date of Service  6/6/2024    Chief Complaint  28 y.o. female admitted 6/2/2024 with 3 column L1 fracture and multiple thoracic spine fractures status post fall while rock climbing.      POD #2 ORIF of T12/L1 fractures with posterolateral instrumentation at T12-L1, L1-L2. Posterolateral arthrodesis at T12-L1 and L1-2.    Interval Events  No critical events overnight.  Reports some muscle spasms otherwise doing well.  Therapies cleared patient. Recommending walker.    - Neurosurgery planning to discontinue hemovac.  - Okay to initiate Lovenox 6/7, per neurosurgery    Patient feeling a little uneasy about discharging today. She states she would like more practice getting in and out of the bed and ambulating while wearing brace. She states she is confident that tomorrow she can discharge. She has set up an appt with her PCP for follow up in 2 weeks and she plans to travel back to [place] on Sunday with family.     Plan to discharge tomorrow. CD requested from film room. [facility] 2 bed sent.    Review of Systems  Review of Systems   Constitutional:  Positive for malaise/fatigue (fatigue while ambulating).   Respiratory:  Negative for shortness of breath.    Gastrointestinal:  Negative for abdominal pain, nausea and vomiting.        Last BM 6/6   Musculoskeletal:  Positive for back pain and myalgias.   Neurological:  Negative for dizziness, tingling, sensory change and weakness.   All other systems reviewed and are negative.       Vital Signs  Temp:  [36 °C (96.8 °F)-36.7 °C (98.1 °F)] 36.2 °C (97.2 °F)  Pulse:  [60-75] 75  Resp:  [15-17] 17  BP: ()/(42-50) 103/45  SpO2:  [91 %-98 %] 92 %    Physical Exam  Physical Exam  Vitals and nursing note reviewed.   Constitutional:       General: She is awake. She is not in acute distress.     Appearance: Normal appearance. She is not ill-appearing.      Comments: Ambulating with brace on.   HENT:      Mouth/Throat:      Mouth: Mucous  membranes are moist.   Eyes:      Pupils: Pupils are equal, round, and reactive to light.   Cardiovascular:      Rate and Rhythm: Normal rate and regular rhythm.      Pulses: Normal pulses.   Pulmonary:      Effort: Pulmonary effort is normal. No respiratory distress.   Abdominal:      General: Bowel sounds are normal. There is no distension.      Palpations: Abdomen is soft.   Genitourinary:     Comments: voiding  Musculoskeletal:      Cervical back: Neck supple.      Comments: Moves all extremities   Brace on      Skin:     General: Skin is warm and dry.      Capillary Refill: Capillary refill takes less than 2 seconds.      Findings: Bruising present.      Comments: Right lower extremity wound - stapled  Surgical dressing C/D/I  Hemovac - plan to be removed today  abrasions   Neurological:      General: No focal deficit present.      Mental Status: She is alert.      GCS: GCS eye subscore is 4. GCS verbal subscore is 5. GCS motor subscore is 6.      Sensory: Sensation is intact.      Motor: Motor function is intact.   Psychiatric:         Mood and Affect: Mood normal.         Judgment: Judgment normal.         Laboratory  Recent Results (from the past 24 hour(s))   CBC with Differential: Tomorrow AM    Collection Time: 06/06/24  1:11 AM   Result Value Ref Range    WBC 6.5 4.8 - 10.8 K/uL    RBC 2.89 (L) 4.20 - 5.40 M/uL    Hemoglobin 9.7 (L) 12.0 - 16.0 g/dL    Hematocrit 27.5 (L) 37.0 - 47.0 %    MCV 95.2 81.4 - 97.8 fL    MCH 33.6 (H) 27.0 - 33.0 pg    MCHC 35.3 32.2 - 35.5 g/dL    RDW 42.9 35.9 - 50.0 fL    Platelet Count 167 164 - 446 K/uL    MPV 9.4 9.0 - 12.9 fL    Neutrophils-Polys 74.90 (H) 44.00 - 72.00 %    Lymphocytes 17.20 (L) 22.00 - 41.00 %    Monocytes 7.00 0.00 - 13.40 %    Eosinophils 0.30 0.00 - 6.90 %    Basophils 0.30 0.00 - 1.80 %    Immature Granulocytes 0.30 0.00 - 0.90 %    Nucleated RBC 0.00 0.00 - 0.20 /100 WBC    Neutrophils (Absolute) 4.83 1.82 - 7.42 K/uL    Lymphs (Absolute) 1.11  1.00 - 4.80 K/uL    Monos (Absolute) 0.45 0.00 - 0.85 K/uL    Eos (Absolute) 0.02 0.00 - 0.51 K/uL    Baso (Absolute) 0.02 0.00 - 0.12 K/uL    Immature Granulocytes (abs) 0.02 0.00 - 0.11 K/uL    NRBC (Absolute) 0.00 K/uL   Comp Metabolic Panel (CMP): Tomorrow AM    Collection Time: 06/06/24  1:11 AM   Result Value Ref Range    Sodium 138 135 - 145 mmol/L    Potassium 4.1 3.6 - 5.5 mmol/L    Chloride 104 96 - 112 mmol/L    Co2 26 20 - 33 mmol/L    Anion Gap 8.0 7.0 - 16.0    Glucose 160 (H) 65 - 99 mg/dL    Bun 11 8 - 22 mg/dL    Creatinine 0.51 0.50 - 1.40 mg/dL    Calcium 7.9 (L) 8.5 - 10.5 mg/dL    Correct Calcium 8.5 8.5 - 10.5 mg/dL    AST(SGOT) 81 (H) 12 - 45 U/L    ALT(SGPT) 41 2 - 50 U/L    Alkaline Phosphatase 55 30 - 99 U/L    Total Bilirubin 0.4 0.1 - 1.5 mg/dL    Albumin 3.2 3.2 - 4.9 g/dL    Total Protein 5.4 (L) 6.0 - 8.2 g/dL    Globulin 2.2 1.9 - 3.5 g/dL    A-G Ratio 1.5 g/dL   ESTIMATED GFR    Collection Time: 06/06/24  1:11 AM   Result Value Ref Range    GFR (CKD-EPI) 131 >60 mL/min/1.73 m 2       Fluids    Intake/Output Summary (Last 24 hours) at 6/6/2024 0923  Last data filed at 6/6/2024 0415  Gross per 24 hour   Intake --   Output 70 ml   Net -70 ml       Core Measures & Quality Metrics  Labs reviewed, Medications reviewed and Radiology images reviewed  Castro catheter: No Castro      DVT Prophylaxis: Enoxaparin (Lovenox)  DVT prophylaxis - mechanical: SCDs  Ulcer prophylaxis: Not indicated    Assessed for rehab: Patient was assess for and/or received rehabilitation services during this hospitalization    RAP Score Total: 2    CAGE Results: negative Blood Alcohol>0.08: no       Assessment/Plan  * Trauma- (present on admission)  Assessment & Plan  Rock climbing, 20 ft fall.  Trauma Yellow Activation.  Carito Colorado MD. Trauma Surgery.    Discharge planning issues- (present on admission)  Assessment & Plan  Date of admission: 6/2/2024.  6/4 Transfer orders from TICU.  6/5 Therapies recommend  home health. Patient plans to return to [place] upon discharge. She has set up a follow up appt with her PCP.  Cleared for discharge: Yes - Date: 6/7 .  Discharge delayed: No.  Discharge date: tbd.  Lives in [place].    Lumbar compression fracture, closed, initial encounter (HCC)- (present on admission)  Assessment & Plan  Comminuted 3 column L1 fracture with burst type fracture of the vertebral body with approximately 25% loss of height, mild bony retropulsion. Bilateral facet and left lamina fracture and mild subluxation of the right T12-L1 facet joint.   MRI L1 burst fracture with involvement of posterior elements and extension of the fracture line into the posterior cortex of the vertebral body with mild posterior cortical tilting and slight impingement upon the spinal canal but no cauda equina or conus compression.Thin dorsal epidural hematoma between T10 and L3. No significant mass effect  6/3 Upright films with L1 compression burst fracture with anterior wedging and right lateral wedging resulting in localized curvature convex left.   6/4 ORIF of T12/L1 fractures with posterolateral instrumentation at T12-L1, L1-L2. Posterolateral arthrodesis at T12-L1 and L1-2.   6/6 Hemovac discontinued.  TLSO when out of bed.  No lifting greater than 10 lbs  Angely Solomon MD. Neurosurgeon. [facility].    Fracture of transverse process of thoracic vertebra, closed, initial encounter (HCC)- (present on admission)  Assessment & Plan  Transverse process fractures T1-T8.    No contraindication to deep vein thrombosis (DVT) prophylaxis- (present on admission)  Assessment & Plan  VTE prophylaxis initially contraindicated secondary to elevated bleeding risk.  6/5 Trauma screening bilateral lower extremity venous duplex negative for above knee DVT.  6/7 Prophylactic dose enoxaparin 30 mg BID initiated.   VTE Prophylaxis approved by Neurosurgery.    Abrasion- (present on admission)  Assessment & Plan  Multiple  abrasions.  Wound care.    Avulsion of leg, right, initial encounter- (present on admission)  Assessment & Plan  Avulsion.  Wound care.  6/4 Staple closure.         Discussed patient condition with RN, Patient, and trauma surgery, Dr. Colorado.

## 2024-06-06 NOTE — DISCHARGE INSTRUCTIONS
Neurosurgery Instructions:  You will follow up with primary provider at home in 2 weeks for incision check.   You may reach out to [facility] at any time for questions or concerns at  [phone].  You will need to follow up at 6 weeks for post-operative check with x-rays either with the group or with a neurosurgeon in [place].     Activity restrictions:   No pushing, pulling, or lifting greater than 10 pounds (about a gallon of milk).  No repetitive bending, twisting, or lifting. You must wear the TLSO brace when out of bed, you can put it on at the edge of the bed and may remove when showering as well.  Ambulate as much is comfortable, with short bursts of activity.  No driving for at least 2 weeks following surgery especially while taking narcotic medications.     Incision care:  You may shower starting tomorrow. Do not let the water directly hit the incision, let the warm water and soap run over the incision, no rubbing or scrubbing. Pat the incision to dry and leave open to air. Do not apply creams, gels, lotions to the incision.   No soaking/submerging the incision - no pools, baths, hot tubs, rivers, lakes, etc for 6 weeks.     Medications:  Do not take non-steroidal anti-inflammatory medications (e.g. ibuprofen, Motrin, naproxen, Aleve, Advil, etc) or aspirin until cleared by Dr. Solomon.  You should take a stool softener (Colace) while you are taking narcotic medications, for at least 2 weeks after surgery. You can purchase this over-the-counter, but a prescription will also be sent in. If you are still constipated, you may take a laxative (e.g. senna, Miralax, milk of magnesia, etc) as needed.          - Call or seek medical attention for questions or concerns  - Follow up with the [facility] RET[facility]: as needed  - Follow up with Dr. Solomon in 6 weeks time, avoid all blood thinners including aspirin or NSAIDs (ibuprophen, Advil, Aleve, Motrin) for at least two  weeks  - Follow up with primary care provider within two weeks time for wound recheck  - Resume regular diet  - May take over the counter acetaminophen as needed for pain  - Continue daily over the counter stool softener while on narcotics  - No operation of machinery or motorized vehicles while under the influence of narcotics  - No alcohol, marijuana or illicit drug use while under the influence of narcotics  - In the event of a narcotic overdose naloxone (Narcan) is available without a prescription from any [facility]  - No swimming, hot tubs, baths or wound submersion until cleared by outpatient provider. May shower  - No contact sports, strenuous activities, or heavy lifting until cleared by outpatient provider  - If respiratory decompensation, persistent or worsening pain, or signs or symptoms of infection occur seek medical attention

## 2024-06-06 NOTE — PROGRESS NOTES
Called by LMSW regarding patient and family's concern regarding discharge tomorrow.    The patient was concerned regarding her pain and traveling.  The patient had not received any narcotics in 24 hours.  She did not realize she had to ask for pain medication.  Since receiving pain medication her pain is tolerable and she is able to sit and ambulate.  I reassured her that she would be sent home with pain medication and muscle relaxers.  Additionally, I recommend that she takes ice packs with her for her trip.  I also recommend that she takes Tylenol over-the-counter every 6 hours.  Her parents were also concerned with the height of the bed at the hotel and the patient not being able to get in and out.  She stated that therapy had shown her how to logroll to the edge and sit at the edge of the bed to don her brace.  She stated she felt comfortable and capable to do that.  Patient and her parents had no further questions and feel comfortable discharging home tomorrow.

## 2024-06-06 NOTE — DISCHARGE SUMMARY
Trauma Discharge Summary    DATE OF ADMISSION: 6/2/2024    DATE OF DISCHARGE: 6/7/2024    LENGTH OF STAY: 5 days    ATTENDING PHYSICIAN: Carito Colorado M.D.    CONSULTING PHYSICIAN:   Nerissa.  Angely Solomon MD, neurosurgery    DISCHARGE DIAGNOSIS:  Principal Problem:    Trauma  Active Problems:    Lumbar compression fracture, closed, initial encounter ([facility])    Discharge planning issues    Avulsion of leg, right, initial encounter    Abrasion    No contraindication to deep vein thrombosis (DVT) prophylaxis    Fracture of transverse process of thoracic vertebra, closed, initial encounter ([facility])  Resolved Problems:    * No resolved hospital problems. *      PROCEDURES:  1.  Date of procedure 6/4/2024, performed by Dr. Solomon, neurosurgery  - 6/4 ORIF of T12/L1 fractures with posterolateral instrumentation at T12-L1, L1-L2. Posterolateral arthrodesis at T12-L1 and L1-2.    HISTORY OF PRESENT ILLNESS: The patient is a 28 y.o. female who was reportedly injured when she fell approximately 20 feet while rock climbing.  She denied loss of consciousness.  She was transferred to [facility] in [place], [place].    HOSPITAL COURSE: The patient was triaged as a trauma yellow activation.  In the trauma bay she was very anxious and repetitive.  She was able to answer orientation questions correctly.  She did complain of pain in her back.  The patient was transported to [facility] intensive care unit.    CT imaging demonstrated a comminuted L1 fracture with burst type fracture of the vertebral body with approximately 25% loss of height, bilateral facet and left laminal fractures, and mild subluxation of the right T12-L1 facet joint.  MRI showed an L1 burst fracture with mild posterior cortical tilting and slight impingement upon the spinal canal but no cauda equina or conus compression.  A thin dorsal epidural hematoma was noted between T10 and L3.  Dr. Solomon was consulted for these injuries.  She was initially managed  nonoperatively and TLSO bracing.  Upright x-rays in the brace showed 20 degree local kyphosis.  She had significant pain with ambulation but no pain when lying down.  Therefore, Dr. Solomon proceeded with the patient to the operating suite on 6/4/2024.  For details regarding surgery please refer to Dr. Solomon's postoperative note.  Postop day 1 patient stated that she felt more stable and had improved back pain.  She was able to work with therapies and they cleared her for discharge home.  She was taught how to do don her TLSO and has been practicing.  She will need to follow-up at 6 weeks for postoperative check with x-rays either with Dr. Solomon or with a neurosurgeon in [place].  Additionally, she will follow-up with her primary provider at home in 2 weeks for incision check. Staples were removed prior to discharge    HOSPITAL PROBLEM LIST:  * Trauma- (present on admission)  Assessment & Plan  Rock climbing, 20 ft fall.  Trauma Yellow Activation.  Carito Colorado MD. Trauma Surgery.    Discharge planning issues- (present on admission)  Assessment & Plan  Date of admission: 6/2/2024.  [facility]  6/5 Therapies recommend home health. Patient plans to return to [place] upon discharge. She has set up a follow up appt with her PCP.  Cleared for discharge: Yes - Date: 6/7 .  Discharge delayed: No.  Discharge date: tbd.  Lives in [place].    Lumbar compression fracture, closed, initial encounter (HCC)- (present on admission)  Assessment & Plan  Comminuted 3 column L1 fracture with burst type fracture of the vertebral body with approximately 25% loss of height, mild bony retropulsion. Bilateral facet and left lamina fracture and mild subluxation of the right T12-L1 facet joint.   MRI L1 burst fracture with involvement of posterior elements and extension of the fracture line into the posterior cortex of the vertebral body with mild posterior cortical tilting and slight impingement upon the spinal  canal but no cauda equina or conus compression.Thin dorsal epidural hematoma between T10 and L3. No significant mass effect  6/3 Upright films with L1 compression burst fracture with anterior wedging and right lateral wedging resulting in localized curvature convex left.   6/4 ORIF of T12/L1 fractures with posterolateral instrumentation at T12-L1, L1-L2. Posterolateral arthrodesis at T12-L1 and L1-2.   6/6 Hemovac discontinued.  TLSO when out of bed.  No lifting greater than 10 lbs  Angely Solomon MD. Neurosurgeon. [facility].    Fracture of transverse process of thoracic vertebra, closed, initial encounter (HCC)- (present on admission)  Assessment & Plan  Transverse process fractures T1-T8.    No contraindication to deep vein thrombosis (DVT) prophylaxis- (present on admission)  Assessment & Plan  VTE prophylaxis initially contraindicated secondary to elevated bleeding risk.  6/5 Trauma screening bilateral lower extremity venous duplex negative for above knee DVT.  6/7 Prophylactic dose enoxaparin 30 mg BID initiated.   VTE Prophylaxis approved by Neurosurgery.    Abrasion- (present on admission)  Assessment & Plan  Multiple abrasions.  Wound care.    Avulsion of leg, right, initial encounter- (present on admission)  Assessment & Plan  Avulsion.  Wound care.  6/4 Staple closure.           DISPOSITION: Discharged on 6/7/2024. The patient and family were counseled and questions were answered. Specifically, signs and symptoms of infection, respiratory decompensation, neurological decompensation and persistent or worsening pain were discussed and the patient agrees to seek medical attention if any of these develop.    DISCHARGE MEDICATIONS:  The patients controlled substance history was reviewed and a controlled substance use informed consent (if applicable) was provided by [facility] and the patient has been prescribed.     Medication List        START taking these medications         Instructions   acetaminophen 500 MG Tabs  Commonly known as: Tylenol   Take 1-2 Tablets by mouth every 6 hours as needed for Moderate Pain or Mild Pain. Take as directed on the bottle. Take as needed for pain. Recommend taking in conjunction with narcotics.  Dose: 500-1,000 mg     Naloxone 4 MG/0.1ML Liqd  Commonly known as: Narcan   Administer 4 mg into affected nostril(S) one time for 1 dose.  Dose: 1 Spray     oxyCODONE immediate-release 5 MG Tabs  Commonly known as: Roxicodone   Take 1 Tablet by mouth every four hours as needed for Severe Pain for up to 7 days.  Dose: 5 mg     senna-docusate 8.6-50 MG Tabs  Commonly known as: Pericolace Or Senokot S   Take 1 Tablet by mouth every day. Take as directed on the bottle. Take while on narcotics to avoid constipation.  Dose: 1 Tablet     tizanidine 4 MG Tabs  Commonly known as: Zanaflex   Take 1 Tablet by mouth every 6 hours for 14 days.  Dose: 4 mg            CONTINUE taking these medications        Instructions   escitalopram 10 MG Tabs  Commonly known as: Lexapro   Take 10 mg by mouth every day.  Dose: 10 mg              ACTIVITY:   She is not to lift anything greater than 10 pounds, no repetitive bending, twisting and or lifting.  She should wear her TLSO brace when out of bed, she can put it on at the edge of the bed and remove it when showering as well.  She can ambulate as much is comfortable with short bursts of activity.  She will not drive for at least 2 weeks following surgery.    WOUND CARE:  May shower.  No wound or incision submersion. Right lower extremity wound may be open to air.  Dry dressing and may change as needed.  Follow up outpatient with neurosurgery for incision assessment and potential staple removal.     DIET:  Orders Placed This Encounter   Procedures    Diet Order Diet: Regular     Standing Status:   Standing     Number of Occurrences:   1     Order Specific Question:   Diet:     Answer:   Regular [1]       FOLLOW UP:  Angely Solomon  M.D.  [id number]  [place] [address]  [phone]    Follow up  Follow-up at 2 weeks for wound recheck.  You may also follow-up with your primary care physician  Follow-up at 6 weeks for reimaging.  You may also follow-up with a neurosurgeon in your hometown.    PCP    Follow up  Follow-up in 2 weeks for wound recheck.      TIME SPENT ON DISCHARGE: 34 minutes    ____________________________________________  BARBY Guerrero

## 2024-06-06 NOTE — DISCHARGE PLANNING
Case Management Discharge Planning    Admission Date: 6/2/2024  GMLOS: 2.8  ALOS: 4    6-Clicks ADL Score: 19  6-Clicks Mobility Score: 18      Anticipated Discharge Dispo: Discharge Disposition: Discharged to home/self care (01)  Discharge Address: [address] [place] [address]  Discharge Contact Phone Number: [phone]    DME Needed: Yes    DME Ordered: Yes    Action(s) Taken: LMSW met with pt at bedside to complete assessment and discuss DCP. Pt and pt's parents expressed their concern with pt DC to [facility] while awaiting flight back home to [place] on Monday. Pt requested to speak to trauma team prior to DC. LMSW sent message to to inform trauma team of pt's concern. Pt also stated that she was concerned with pain, LMSW informed that pain management has been assessed and treatment has been provided. LMSW informed pt that PT/OT recommended FWW prior to DC, pt made choice for 1) [facility]. Choice form was faxed to [facility]. Pt was provided with medical records information to send PCP in [place] for assistance with HH. Pending DC confirmation from provider, potential DC tomorrow.    Escalations Completed: None    Medically Clear: No    Next Steps: LMSW to follow for any additioanl CM needs.    Barriers to Discharge: Medical clearance    Is the patient up for discharge tomorrow: Potentially.    Care Transition Team Assessment  PCP: Dr. Debra Jamison at [facility]  LMSW met with pt and pt's parents at bedside to complete assessment. Pt A&Ox4 and able to verify the information on the face sheet. Pt has been traveling and living in her van for the past couple months however, will DC to her parents home [address] [place] [address]. Prior to this hospitalization, pt reports being independent at home with ADLs and IADLs. Pt denies any DME at baseline. Pt reports her parents as good support for her. Pt was a full-time employee at [employer] prior to hospitalization. Pt denies any substance use or current mental  health concerns. Pt denies having an advance directive and refused AD packet at this time.    Information Source  Orientation Level: Oriented X4  Information Given By: Patient  Informant's Name: Tracy  Who is responsible for making decisions for patient? : Patient    Readmission Evaluation  Is this a readmission?: No    Elopement Risk  Legal Hold: No  Ambulatory or Self Mobile in Wheelchair: No-Not an Elopement Risk  Elopement Risk: Not at Risk for Elopement    Interdisciplinary Discharge Planning  Lives with - Patient's Self Care Capacity: Alone and Able to Care For Self  Patient or legal guardian wants to designate a caregiver: No  Support Systems: Parent, Family Member(s)  Housing / Facility: Motor Home  Prior Services: Home-Independent    Discharge Preparedness  What is your plan after discharge?: Home health care  What are your discharge supports?: Parent  Prior Functional Level: Ambulatory, Drives Self, Independent with Activities of Daily Living, Independent with Medication Management  Difficulity with ADLs: None  Difficulity with IADLs: None    Functional Assesment  Prior Functional Level: Ambulatory, Drives Self, Independent with Activities of Daily Living, Independent with Medication Management    Finances  Financial Barriers to Discharge: No  Prescription Coverage: Yes    Vision / Hearing Impairment  Vision Impairment : No  Hearing Impairment : No    Advance Directive  Advance Directive?: None  Advance Directive offered?: AD Booklet refused    Domestic Abuse  Have you ever been the victim of abuse or violence?: No  Possible Abuse/Neglect Reported to:: Not Applicable    Psychological Assessment  History of Substance Abuse: None  History of Psychiatric Problems: Yes  Non-compliant with Treatment: No  Newly Diagnosed Illness: No    Discharge Risks or Barriers  Discharge risks or barriers?: No    Anticipated Discharge Information  Discharge Disposition: Discharged to home/self care (01)  Discharge Address:  [address] [place] [address]  Discharge Contact Phone Number: [phone]

## 2024-06-06 NOTE — PROGRESS NOTES
Neurosurgery Progress Note    Subjective:  No acute events overnight  Some incisional and general back pain, controlled  Has been OOB and ambulating with TLSO  + BM    Exam:  A&O  PERRL  URENA with good strength   SILT    Lumbar incision c/d/I, dressing removed  Hvac output 20mL/8hr, serosanguineous      BP  Min: 85/45  Max: 103/45  Pulse  Av.7  Min: 60  Max: 75  Resp  Av.2  Min: 15  Max: 17  Temp  Av.4 °C (97.5 °F)  Min: 36 °C (96.8 °F)  Max: 36.7 °C (98.1 °F)  SpO2  Av.4 %  Min: 91 %  Max: 98 %    No data recorded    Recent Labs     24  0524  0111   WBC 7.8 9.4 6.5   RBC 3.62* 3.06* 2.89*   HEMOGLOBIN 11.9* 10.3* 9.7*   HEMATOCRIT 34.7* 29.6* 27.5*   MCV 95.9 96.7 95.2   MCH 32.9 33.7* 33.6*   MCHC 34.3 34.8 35.3   RDW 44.6 43.9 42.9   PLATELETCT 214 185 167   MPV 9.0 9.3 9.4     Recent Labs     24  0555 24  0111   SODIUM 140 136 138   POTASSIUM 4.0 3.9 4.1   CHLORIDE 108 103 104   CO2 22 22 26   GLUCOSE 96 87 160*   BUN 10 16 11   CREATININE 0.52 0.46* 0.51   CALCIUM 8.3* 8.0* 7.9*                 Intake/Output                         24 - 24 - 2459      Total  Total                 Intake    Total Intake -- -- -- -- -- --       Output    Urine  --  -- --  --  -- --    Number of Times Voided 1 x 1 x 2 x -- -- --    Drains  30  40 70  --  -- --    Output (mL) ([REMOVED] Closed/Suction Drain 1 Midline Back Hemovac 24 0900) 30 40 70 -- -- --    Stool  --  -- --  --  -- --    Number of Times Stooled -- -- -- 1 x -- 1 x    Total Output 30 40 70 -- -- --       Net I/O     -30 -40 -70 -- -- --              Intake/Output Summary (Last 24 hours) at 2024 0908  Last data filed at 2024 0415  Gross per 24 hour   Intake --   Output 70 ml   Net -70 ml       $ Bladder Scan Results (mL):  (no volume displayed following voiding in toilet)     Nozin nasal   swab  1 Applicator BID    morphine injection  2 mg Q3HRS PRN    tizanidine  4 mg Q6HRS    escitalopram  10 mg DAILY    Respiratory Therapy Consult   Continuous RT    Pharmacy Consult Request  1 Each PHARMACY TO DOSE    ondansetron  4 mg Q4HRS PRN    ondansetron  4 mg Q4HRS PRN    docusate sodium  100 mg BID    senna-docusate  1 Tablet Nightly    senna-docusate  1 Tablet Q24HRS PRN    polyethylene glycol/lytes  1 Packet BID    magnesium hydroxide  30 mL DAILY AT 1800    bisacodyl  10 mg Q24HRS PRN    sodium phosphate  1 Each Once PRN    acetaminophen  1,000 mg Q6HRS    Followed by    [START ON 6/7/2024] acetaminophen  1,000 mg Q6HRS PRN    oxyCODONE immediate-release  5 mg Q3HRS PRN    Or    oxyCODONE immediate-release  10 mg Q3HRS PRN    gabapentin  100 mg Q8HRS    bacitracin-polymyxin b   TID     XR lumbar AP and lateral 6/3/24 L1 compression/burst fracture, 20deg local kyphosis (from 19 on MRI). Coronal deformity with T12 apex, increased angle 13deg from 2deg on MRI.    Tracy Hauser is a 28 y.o. female presenting with multiple thoracic spinous process fractures, a 3-column burst fracture at L1, mild loss of height, mild retropulsion, T12-L1 right joint subluxation, KALI E, TLICS 4.     Assessment and Plan:  Hospital day #5 L1 burst/compression fracture with right T12-L1 facet subluxation and instability  POD #2      T12-L1 fusion  Prophylactic anticoagulation: no Start date/time: POD2.     Brain Compression: No    - Dc hvac today  - Dressing removed, can shower starting tomorrow.  - Incision care in dc instructions  - Continue pain control, utilize muscle relaxers as much as possible  - OOB with TLSO and ambulating as much as tolerated.  - Can dc from neurosurgical perspective, will need to follow-up with primary care in 2 weeks for incision check. Will also need follow-up at 6 weeks with x-rays wither with [facility] or neurosurgeon at home.    Please call with any further questions or  concerns.    Liya Izaguirre

## 2024-06-06 NOTE — PROGRESS NOTES
Virtual Nurse rounding complete.    Round Needs: Patient requested medication for pain. Notified primary RN. No other needs at this time.

## 2024-06-06 NOTE — THERAPY
"Physical Therapy   Daily Treatment     Patient Name: Tracy Hauser  Age:  28 y.o., Sex:  female  Medical Record #: [id number]  Today's Date: 6/6/2024     Precautions  Precautions: Fall Risk;Spinal / Back Precautions ;TLSO (Thoracolumbosacral orthosis)  Comments: TLSO donned sitting EOB    Assessment    Pt received in bed and agreeable to PT session. Pt reported she feels much better this afternoon after learning she needs to ask for oxycodone and has taken it. Pt was able to ambulate with no AD around the unit with supervision. Pt reports she is able to perform sup<>sit from a flat bed, it is just more uncomfortable. Answered questions from family regarding travel with TLSO, return to activity, current activity recommendations for return home, and follow up with outpatient PT. TLSO straps also adjusted and trimmed for easier management. Will follow for acute PT as needed to progress.     Plan    Treatment Plan Status: Continue Current Treatment Plan  Type of Treatment: Bed Mobility, Equipment, Family / Caregiver Training, Gait Training, Neuro Re-Education / Balance, Self Care / Home Evaluation, Stair Training, Therapeutic Activities, Therapeutic Exercise  Treatment Frequency: 5 Times per Week  Treatment Duration: Until Therapy Goals Met    DC Equipment Recommendations: Front-Wheel Walker  Discharge Recommendations: Recommend outpatient physical therapy services to address higher level deficits    Subjective    \"Now that I took the oxycodone, I feel a lot better this afternoon\"     Objective       06/06/24 1528   Precautions   Precautions Fall Risk;Spinal / Back Precautions ;TLSO (Thoracolumbosacral orthosis)   Comments TLSO donned sitting EOB   Vitals   O2 Delivery Device None - Room Air   Pain 0 - 10 Group   Therapist Pain Assessment Post Activity Pain Same as Prior to Activity;Nurse Notified  (reports pain improved with taking pain meds earlier)   Cognition    Level of Consciousness Alert   Comments Very pleasant and " cooperative, receptive to education   Other Treatments   Other Treatments Provided Parents at bedside. Adjusted and trimmed TLSO straps for ease of adjustment. Answered questions regarding home health, return to activity, follow up with outpatient PT, and activity recommendations for return home while limited by precautions and the TLSO.   Balance   Sitting Balance (Static) Fair +   Sitting Balance (Dynamic) Fair +   Standing Balance (Static) Fair   Standing Balance (Dynamic) Fair   Weight Shift Sitting Good   Weight Shift Standing Good   Skilled Intervention Verbal Cuing   Comments no AD   Bed Mobility    Supine to Sit Supervised   Scooting Supervised   Rolling Supervised   Comments Discussed sleeping in a recliner or getting wedges for the bed as pt more comfortable in a reclined position versus flat.   Gait Analysis   Gait Level Of Assist Supervised   Assistive Device None   Distance (Feet) 250   # of Times Distance was Traveled 1   Deviation Bradykinetic   Skilled Intervention Verbal Cuing   Functional Mobility   Sit to Stand Supervised   Bed, Chair, Wheelchair Transfer Supervised   Mobility up with no AD   Skilled Intervention Verbal Cuing   6 Clicks Assessment - How much HELP from from another person do you currently need... (If the patient hasn't done an activity recently, how much help from another person do you think he/she would need if he/she tried?)   Turning from your back to your side while in a flat bed without using bedrails? 4   Moving from lying on your back to sitting on the side of a flat bed without using bedrails? 3   Moving to and from a bed to a chair (including a wheelchair)? 4   Standing up from a chair using your arms (e.g., wheelchair, or bedside chair)? 4   Walking in hospital room? 4   Climbing 3-5 steps with a railing? 3   6 clicks Mobility Score 22   Short Term Goals    Short Term Goal # 1 pt will be able to complete supine<>sitting from flat bed with SPV in 6tx in order to progress  to prior level   Goal Outcome # 1 Progressing as expected   Short Term Goal # 2 pt will be able to complete STS with no AD and SPV in 6tx in order to progress to prior level   Goal Outcome # 2 Goal met   Short Term Goal # 3 pt will be able to ambulate 150ft with no AD and SPV in 6tx in order to improve mobility   Goal Outcome # 3 Goal met   Physical Therapy Treatment Plan   Physical Therapy Treatment Plan Continue Current Treatment Plan   Anticipated Discharge Equipment and Recommendations   DC Equipment Recommendations Front-Wheel Walker   Discharge Recommendations Recommend outpatient physical therapy services to address higher level deficits   Interdisciplinary Plan of Care Collaboration   IDT Collaboration with  Nursing;Family / Caregiver   Patient Position at End of Therapy Seated;Call Light within Reach;Tray Table within Reach;Phone within Reach;Family / Friend in Room   Collaboration Comments RN updated   Session Information   Date / Session Number  6/6-3 (3/5, 6/9) hold 6/4

## 2024-06-07 ENCOUNTER — PHARMACY VISIT (OUTPATIENT)
Dept: PHARMACY | Facility: MEDICAL CENTER | Age: 29
End: 2024-06-07
Payer: COMMERCIAL

## 2024-06-07 VITALS
HEART RATE: 56 BPM | SYSTOLIC BLOOD PRESSURE: 93 MMHG | WEIGHT: 130.73 LBS | HEIGHT: 63 IN | OXYGEN SATURATION: 97 % | RESPIRATION RATE: 17 BRPM | BODY MASS INDEX: 23.16 KG/M2 | DIASTOLIC BLOOD PRESSURE: 50 MMHG | TEMPERATURE: 97.3 F

## 2024-06-07 PROCEDURE — 700102 HCHG RX REV CODE 250 W/ 637 OVERRIDE(OP)

## 2024-06-07 PROCEDURE — 99239 HOSP IP/OBS DSCHRG MGMT >30: CPT | Performed by: NURSE PRACTITIONER

## 2024-06-07 PROCEDURE — 700111 HCHG RX REV CODE 636 W/ 250 OVERRIDE (IP)

## 2024-06-07 PROCEDURE — A9270 NON-COVERED ITEM OR SERVICE: HCPCS | Performed by: SURGERY

## 2024-06-07 PROCEDURE — A9270 NON-COVERED ITEM OR SERVICE: HCPCS

## 2024-06-07 PROCEDURE — 700102 HCHG RX REV CODE 250 W/ 637 OVERRIDE(OP): Performed by: SURGERY

## 2024-06-07 RX ADMIN — DEXTROMETHORPHAN HYDROBROMIDE, GUAIFENESIN, PHENYLEPHRINE HYDROCHLORIDE: 20; 200; 10 SOLUTION ORAL at 05:35

## 2024-06-07 RX ADMIN — ENOXAPARIN SODIUM 30 MG: 100 INJECTION SUBCUTANEOUS at 05:34

## 2024-06-07 RX ADMIN — ESCITALOPRAM OXALATE 10 MG: 10 TABLET ORAL at 05:34

## 2024-06-07 RX ADMIN — OXYCODONE HYDROCHLORIDE 10 MG: 10 TABLET ORAL at 09:55

## 2024-06-07 RX ADMIN — TIZANIDINE 4 MG: 4 TABLET ORAL at 00:00

## 2024-06-07 RX ADMIN — GABAPENTIN 100 MG: 100 CAPSULE ORAL at 05:34

## 2024-06-07 RX ADMIN — ACETAMINOPHEN 1000 MG: 500 TABLET, FILM COATED ORAL at 05:34

## 2024-06-07 RX ADMIN — DOCUSATE SODIUM 100 MG: 100 CAPSULE, LIQUID FILLED ORAL at 05:34

## 2024-06-07 RX ADMIN — OXYCODONE HYDROCHLORIDE 10 MG: 10 TABLET ORAL at 06:40

## 2024-06-07 RX ADMIN — OXYCODONE HYDROCHLORIDE 10 MG: 10 TABLET ORAL at 02:45

## 2024-06-07 RX ADMIN — TIZANIDINE 4 MG: 4 TABLET ORAL at 05:34

## 2024-06-07 ASSESSMENT — PAIN DESCRIPTION - PAIN TYPE
TYPE: SURGICAL PAIN

## 2024-06-07 NOTE — CARE PLAN
Problem: Pain - Standard  Goal: Alleviation of pain or a reduction in pain to the patient’s comfort goal  Outcome: Progressing     Problem: Knowledge Deficit - Standard  Goal: Patient and family/care givers will demonstrate understanding of plan of care, disease process/condition, diagnostic tests and medications  Outcome: Progressing       The patient is Stable - Low risk of patient condition declining or worsening    Shift Goals  Clinical Goals: Pain control  Patient Goals: Pain control  Family Goals: Not present    Progress made toward(s) clinical / shift goals:  Taught pt 0-10 pain scale and non-pharmacological method of pain management, encouraged to verbalize when in pain. Administered PRN pain meds as needed.     Patient is not progressing towards the following goals:      
Problem: Pain - Standard  Goal: Alleviation of pain or a reduction in pain to the patient’s comfort goal  Outcome: Progressing     Problem: Knowledge Deficit - Standard  Goal: Patient and family/care givers will demonstrate understanding of plan of care, disease process/condition, diagnostic tests and medications  Outcome: Progressing       The patient is Stable - Low risk of patient condition declining or worsening    Shift Goals  Clinical Goals: Pain control, BM  Patient Goals: Pain control, BM  Family Goals: Not present    Progress made toward(s) clinical / shift goals:  Taught pt 0-10 pain scale and non-pharmacological method of pain management, encouraged to verbalize when in pain. Administered PRN pain meds as needed. Bowel protocol in place, (+) BM during shift.     Patient is not progressing towards the following goals:      
The patient is Stable - Low risk of patient condition declining or worsening    Shift Goals  Clinical Goals: Mobility, TLSO and pain control  Patient Goals: rest, updates on POC  Family Goals: updates, questions regarding disposition    Progress made toward(s) clinical / shift goals:      Problem: Pain - Standard  Goal: Alleviation of pain or a reduction in pain to the patient’s comfort goal  Outcome: Progressing     Problem: Knowledge Deficit - Standard  Goal: Patient and family/care givers will demonstrate understanding of plan of care, disease process/condition, diagnostic tests and medications  Outcome: Progressing     Problem: Skin Integrity  Goal: Skin integrity is maintained or improved  Outcome: Progressing     
The patient is Stable - Low risk of patient condition declining or worsening    Shift Goals  Clinical Goals: Pain control, mobility  Patient Goals: Pain control, comfort  Family Goals: Not present    Progress made toward(s) clinical / shift goals:  Patient educated on the pain scale and to notify RN of pain. Patient verbalizes pain control using PRN oxycodone and scheduled medications. Patient encouraged to notify RN if pain remains uncontrolled. Patient medicated per MAR.     Patient is not progressing towards the following goals:      
The patient is Stable - Low risk of patient condition declining or worsening    Shift Goals  Clinical Goals: Pain control, mobility, BM  Patient Goals: Pain control, BM  Family Goals: Not present    Progress made toward(s) clinical / shift goals:  Patient educated on the pain scale and to notify RN of pain. Patient verbalizes pain control using PRN oxycodone and scheduled medications. Patient encouraged to notify RN if pain remains uncontrolled. Patient medicated per MAR.    Patient is not progressing towards the following goals:      
The patient is Stable - Low risk of patient condition declining or worsening    Shift Goals  Clinical Goals: Pain management, mobility, urinate  Patient Goals: Pain control, rest  Family Goals: update/ comfort    Progress made toward(s) clinical / shift goals:        Problem: Pain - Standard  Goal: Alleviation of pain or a reduction in pain to the patient’s comfort goal  Outcome: Progressing     Problem: Knowledge Deficit - Standard  Goal: Patient and family/care givers will demonstrate understanding of plan of care, disease process/condition, diagnostic tests and medications  Outcome: Progressing     Problem: Skin Integrity  Goal: Skin integrity is maintained or improved  Outcome: Progressing     Pain being controlled, Patient educated about the next steps in her recovery.           
The patient is Stable - Low risk of patient condition declining or worsening    Shift Goals  Clinical Goals: pain control, TLSO and advance mobility  Patient Goals: rest, pain control, get up  Family Goals: updates    Progress made toward(s) clinical / shift goals:    Problem: Pain - Standard  Goal: Alleviation of pain or a reduction in pain to the patient’s comfort goal  Outcome: Progressing     Problem: Knowledge Deficit - Standard  Goal: Patient and family/care givers will demonstrate understanding of plan of care, disease process/condition, diagnostic tests and medications  Outcome: Progressing       Patient is not progressing towards the following goals:      
The patient is Stable - Low risk of patient condition declining or worsening    Shift Goals  Clinical Goals: q4 neuro, void, mobility, hemovac  Patient Goals: void  Family Goals: na    Progress made toward(s) clinical / shift goals:    Problem: Pain - Standard  Goal: Alleviation of pain or a reduction in pain to the patient’s comfort goal  Outcome: Progressing  Note: Patient educated on 0-10 pain scale, available pain medications, and non-pharmacological methods of pain management. Verbalizes understanding.        Problem: Knowledge Deficit - Standard  Goal: Patient and family/care givers will demonstrate understanding of plan of care, disease process/condition, diagnostic tests and medications  Outcome: Progressing  Note: Patient and family updated on POC. No concerns at this time. Personal belongings and call light within reach.        Problem: Skin Integrity  Goal: Skin integrity is maintained or improved  Outcome: Progressing     Problem: Mobility  Goal: Patient's capacity to carry out activities will improve  Outcome: Progressing  Flowsheets (Taken 6/5/2024 1301)  Mobility:   Encouraged mobilization per interdisciplinary team recommendations   Monitored for signs of activity intolerance   Provided assistive devices   Provided rest periods between activities   Administered pain management to allow progressive mobilization  Note: Patient up to bathroom SBA FWW. Tolerated well. TLSO brace donned. Patient eager to see physical therapy.       Problem: Wound/ / Incision Healing  Goal: Patient's wound/surgical incision will decrease in size and heals properly  Outcome: Progressing       Patient is not progressing towards the following goals:      
The patient is Watcher - Medium risk of patient condition declining or worsening    Shift Goals  Clinical Goals: Surgery, mobility  Patient Goals: surgery  Family Goals: updates    Progress made toward(s) clinical / shift goals:    Problem: Pain - Standard  Goal: Alleviation of pain or a reduction in pain to the patient’s comfort goal  Outcome: Progressing     Problem: Knowledge Deficit - Standard  Goal: Patient and family/care givers will demonstrate understanding of plan of care, disease process/condition, diagnostic tests and medications  Outcome: Progressing       Patient is not progressing towards the following goals:      
The patient is Watcher - Medium risk of patient condition declining or worsening    Shift Goals  Clinical Goals: pain control, hemodymic stability, spinal percautions  Patient Goals: rest, pain control  Family Goals: updates on plan of care    Progress made toward(s) clinical / shift goals:      Problem: Pain - Standard  Goal: Alleviation of pain or a reduction in pain to the patient’s comfort goal  Outcome: Progressing     Problem: Knowledge Deficit - Standard  Goal: Patient and family/care givers will demonstrate understanding of plan of care, disease process/condition, diagnostic tests and medications  Outcome: Progressing     Problem: Skin Integrity  Goal: Skin integrity is maintained or improved  Outcome: Progressing     
Detail Level: Detailed
Detail Level: Zone
Recommended skin medica .05 or 1.0 for acne

## 2024-06-07 NOTE — DISCHARGE PLANNING
Received choice form @: [id number]  Agency/Facility name: [facility] referral per choice form @: [id number]

## 2024-06-07 NOTE — DISCHARGE PLANNING
Patient medically cleared by provider and discharge order placed. PT recommending outpatient therapy and FWW.   Choice form signed by patient for [facility] and bedside RN notified to obtain FWW from traction.   Medical records sent by previous SW to patient's PCP is [place] for assistance with HH, which was OT recommendations.   Anticipating no further needs from case management at this time.     Case Management Discharge Planning    Admission Date: 6/2/2024  GMLOS: 2.8  ALOS: 5    6-Clicks ADL Score: 19  6-Clicks Mobility Score: 22      Anticipated Discharge Dispo: Discharge Disposition: Discharged to home/self care (01)  Discharge Address: [address] [place] [address]  Discharge Contact Phone Number: [phone]    DME Needed: Yes    DME Ordered: Yes    Action(s) Taken: Updated Provider/Nurse on Discharge Plan    Escalations Completed: None    Medically Clear: Yes    Next Steps: Anticipating no further case management needs.     Barriers to Discharge: None    Is the patient up for discharge tomorrow: Today via private transportation.

## 2024-06-07 NOTE — PROGRESS NOTES
Discharge instructions gone over with patient and patients mother. All questions and concerns answered. [facility]

## (undated) DEVICE — SODIUM CHL IRRIGATION 0.9% 1000ML (12EA/CA)

## (undated) DEVICE — TRAY CATHETER FOLEY URINE METER W/STATLOCK 350ML (10EA/CA)

## (undated) DEVICE — BLADE SURGICAL CLIPPER - (50EA/CA)

## (undated) DEVICE — DRAPE LARGE 3 QUARTER - (20/CA)

## (undated) DEVICE — PACK JACKSON TABLE KIT W/OUT - HR (6EA/CA)

## (undated) DEVICE — SPONGE GAUZESTER. 2X2 4-PL - (2/PK 50PK/BX 30BX/CS)

## (undated) DEVICE — TOOL MR8 14CM BALL SYM-TRI 5MM DIAMETER (1/EA)

## (undated) DEVICE — PATTIES SURG NEURO X-RAY 1X1 (10EA/PK 20PK/CA)

## (undated) DEVICE — GLOVE SZ 7.5 BIOGEL PI MICRO - PF LF (50PR/BX)

## (undated) DEVICE — BOVIE BLADE COATED &INSULATED - 25/PK

## (undated) DEVICE — SLEEVE, VASO, THIGH, MED

## (undated) DEVICE — GLOVE SZ 7 BIOGEL PI MICRO - PF LF (50PR/BX 4BX/CA)

## (undated) DEVICE — PATTIES SURG X-RAYCOTTONOID - 1/2 X 3 IN (200/CA)

## (undated) DEVICE — PATTIES SURG NEURO X-RAY 1/2X1/2 (10EA/PK 20PK/CS)

## (undated) DEVICE — CHLORAPREP 26 ML APPLICATOR - ORANGE TINT(25/CA)

## (undated) DEVICE — SYRINGE NON SAFETY 10 CC 21 GA X 1-1/2 IN (100/BX 4BX/CA)

## (undated) DEVICE — TOOL MR8 14CM MATCH HD SYM-TRI 3MM DIAMETER (1/EA)

## (undated) DEVICE — SET LEADWIRE 5 LEAD BEDSIDE DISPOSABLE ECG (1SET OF 5/EA)

## (undated) DEVICE — SPHERE NAVIGATION STEALTH (5EA/TY 12TY/PK)

## (undated) DEVICE — DRAPE STRLE REG TOWEL 18X24 - (10/BX 4BX/CA)"

## (undated) DEVICE — HEADREST PRONEVIEW LARGE - (10/CA)

## (undated) DEVICE — ARMREST CRADLE FOAM - (2PR/PK 12PR/CA)

## (undated) DEVICE — DRESSING TRANSPARENT FILM TEGADERM 2.375 X 2.75" (100EA/BX)"

## (undated) DEVICE — GLOVE BIOGEL PI INDICATOR SZ 8.0 SURGICAL PF LF -(50/BX 4BX/CA)

## (undated) DEVICE — FORCEPS IRRIGATING 8 X 1.5MM (5EA/BX)

## (undated) DEVICE — SOD. CHL. INJ. 0.9% 1000 ML - (14EA/CA 60CA/PF)

## (undated) DEVICE — SUTURE 2-0 VICRYL CT-2 8 X 18 INCH (12EA/BX)

## (undated) DEVICE — GLOVE BIOGEL PI INDICATOR SZ 7.0 SURGICAL PF LF - (50/BX 4BX/CA)

## (undated) DEVICE — SUTURE GENERAL

## (undated) DEVICE — GLOVE BIOGEL SZ 7 SURGICAL PF LTX - (50PR/BX 4BX/CA)

## (undated) DEVICE — DRAPE PATIENT STERILE FOR USE WITH O OR C ARMS (10EA/BX)

## (undated) DEVICE — GLOVE BIOGEL INDICATOR SZ 7SURGICAL PF LTX - (50/BX 4BX/CA)

## (undated) DEVICE — SUTURE 0 VICRYL PLUS CT-1 - 8 X 18 INCH (12/BX)

## (undated) DEVICE — KIT SURGIFLO W/OUT THROMBIN - (6EA/BX)

## (undated) DEVICE — PACK NEURO - (2EA/CA)

## (undated) DEVICE — SPONGE GAUZE STER 4X4 8-PL - (2/PK 50PK/BX 12BX/CS)

## (undated) DEVICE — ELECTRODE DUAL RETURN W/ CORD - (50/PK)

## (undated) DEVICE — DRAPE LAPAROTOMY T SHEET - (12EA/CA)

## (undated) DEVICE — DRAPE MAYO STAND - (30/CA)